# Patient Record
Sex: FEMALE | Race: ASIAN | NOT HISPANIC OR LATINO
[De-identification: names, ages, dates, MRNs, and addresses within clinical notes are randomized per-mention and may not be internally consistent; named-entity substitution may affect disease eponyms.]

---

## 2022-01-14 PROBLEM — Z00.00 ENCOUNTER FOR PREVENTIVE HEALTH EXAMINATION: Status: ACTIVE | Noted: 2022-01-14

## 2022-01-18 ENCOUNTER — APPOINTMENT (OUTPATIENT)
Dept: THORACIC SURGERY | Facility: CLINIC | Age: 77
End: 2022-01-18
Payer: MEDICARE

## 2022-01-21 ENCOUNTER — APPOINTMENT (OUTPATIENT)
Dept: THORACIC SURGERY | Facility: CLINIC | Age: 77
End: 2022-01-21
Payer: MEDICARE

## 2022-01-21 ENCOUNTER — OUTPATIENT (OUTPATIENT)
Dept: OUTPATIENT SERVICES | Facility: HOSPITAL | Age: 77
LOS: 1 days | End: 2022-01-21
Payer: MEDICARE

## 2022-01-21 VITALS
WEIGHT: 126 LBS | HEART RATE: 86 BPM | BODY MASS INDEX: 22.32 KG/M2 | HEIGHT: 63 IN | DIASTOLIC BLOOD PRESSURE: 72 MMHG | RESPIRATION RATE: 16 BRPM | OXYGEN SATURATION: 90 % | TEMPERATURE: 98.4 F | SYSTOLIC BLOOD PRESSURE: 152 MMHG

## 2022-01-21 DIAGNOSIS — R91.1 SOLITARY PULMONARY NODULE: ICD-10-CM

## 2022-01-21 DIAGNOSIS — Z86.59 PERSONAL HISTORY OF OTHER MENTAL AND BEHAVIORAL DISORDERS: ICD-10-CM

## 2022-01-21 DIAGNOSIS — Z01.818 ENCOUNTER FOR OTHER PREPROCEDURAL EXAMINATION: ICD-10-CM

## 2022-01-21 DIAGNOSIS — Z86.79 PERSONAL HISTORY OF OTHER DISEASES OF THE CIRCULATORY SYSTEM: ICD-10-CM

## 2022-01-21 DIAGNOSIS — Z87.39 PERSONAL HISTORY OF OTHER DISEASES OF THE MUSCULOSKELETAL SYSTEM AND CONNECTIVE TISSUE: ICD-10-CM

## 2022-01-21 DIAGNOSIS — Z86.39 PERSONAL HISTORY OF OTHER ENDOCRINE, NUTRITIONAL AND METABOLIC DISEASE: ICD-10-CM

## 2022-01-21 LAB
ALBUMIN SERPL ELPH-MCNC: 4.5 G/DL — SIGNIFICANT CHANGE UP (ref 3.3–5)
ALP SERPL-CCNC: 69 U/L — SIGNIFICANT CHANGE UP (ref 40–120)
ALT FLD-CCNC: 50 U/L — HIGH (ref 10–45)
ANION GAP SERPL CALC-SCNC: 16 MMOL/L — SIGNIFICANT CHANGE UP (ref 5–17)
APPEARANCE UR: CLEAR — SIGNIFICANT CHANGE UP
APTT BLD: 31.4 SEC — SIGNIFICANT CHANGE UP (ref 27.5–35.5)
AST SERPL-CCNC: 41 U/L — HIGH (ref 10–40)
BASOPHILS # BLD AUTO: 0.02 K/UL — SIGNIFICANT CHANGE UP (ref 0–0.2)
BASOPHILS NFR BLD AUTO: 0.4 % — SIGNIFICANT CHANGE UP (ref 0–2)
BILIRUB SERPL-MCNC: 0.3 MG/DL — SIGNIFICANT CHANGE UP (ref 0.2–1.2)
BILIRUB UR-MCNC: NEGATIVE — SIGNIFICANT CHANGE UP
BUN SERPL-MCNC: 18 MG/DL — SIGNIFICANT CHANGE UP (ref 7–23)
CALCIUM SERPL-MCNC: 9.3 MG/DL — SIGNIFICANT CHANGE UP (ref 8.4–10.5)
CHLORIDE SERPL-SCNC: 101 MMOL/L — SIGNIFICANT CHANGE UP (ref 96–108)
CO2 SERPL-SCNC: 28 MMOL/L — SIGNIFICANT CHANGE UP (ref 22–31)
COLOR SPEC: YELLOW — SIGNIFICANT CHANGE UP
CREAT SERPL-MCNC: 0.88 MG/DL — SIGNIFICANT CHANGE UP (ref 0.5–1.3)
DIFF PNL FLD: NEGATIVE — SIGNIFICANT CHANGE UP
EOSINOPHIL # BLD AUTO: 0.23 K/UL — SIGNIFICANT CHANGE UP (ref 0–0.5)
EOSINOPHIL NFR BLD AUTO: 4 % — SIGNIFICANT CHANGE UP (ref 0–6)
GLUCOSE SERPL-MCNC: 111 MG/DL — HIGH (ref 70–99)
GLUCOSE UR QL: >=1000
HCT VFR BLD CALC: 47.4 % — HIGH (ref 34.5–45)
HGB BLD-MCNC: 15.1 G/DL — SIGNIFICANT CHANGE UP (ref 11.5–15.5)
IMM GRANULOCYTES NFR BLD AUTO: 0.4 % — SIGNIFICANT CHANGE UP (ref 0–1.5)
INR BLD: 0.95 — SIGNIFICANT CHANGE UP (ref 0.88–1.16)
KETONES UR-MCNC: NEGATIVE — SIGNIFICANT CHANGE UP
LEUKOCYTE ESTERASE UR-ACNC: NEGATIVE — SIGNIFICANT CHANGE UP
LYMPHOCYTES # BLD AUTO: 1.83 K/UL — SIGNIFICANT CHANGE UP (ref 1–3.3)
LYMPHOCYTES # BLD AUTO: 32 % — SIGNIFICANT CHANGE UP (ref 13–44)
MCHC RBC-ENTMCNC: 27.6 PG — SIGNIFICANT CHANGE UP (ref 27–34)
MCHC RBC-ENTMCNC: 31.9 GM/DL — LOW (ref 32–36)
MCV RBC AUTO: 86.7 FL — SIGNIFICANT CHANGE UP (ref 80–100)
MONOCYTES # BLD AUTO: 0.58 K/UL — SIGNIFICANT CHANGE UP (ref 0–0.9)
MONOCYTES NFR BLD AUTO: 10.2 % — SIGNIFICANT CHANGE UP (ref 2–14)
NEUTROPHILS # BLD AUTO: 3.03 K/UL — SIGNIFICANT CHANGE UP (ref 1.8–7.4)
NEUTROPHILS NFR BLD AUTO: 53 % — SIGNIFICANT CHANGE UP (ref 43–77)
NITRITE UR-MCNC: NEGATIVE — SIGNIFICANT CHANGE UP
NRBC # BLD: 0 /100 WBCS — SIGNIFICANT CHANGE UP (ref 0–0)
PH UR: 5.5 — SIGNIFICANT CHANGE UP (ref 5–8)
PLATELET # BLD AUTO: 202 K/UL — SIGNIFICANT CHANGE UP (ref 150–400)
POTASSIUM SERPL-MCNC: 3.5 MMOL/L — SIGNIFICANT CHANGE UP (ref 3.5–5.3)
POTASSIUM SERPL-SCNC: 3.5 MMOL/L — SIGNIFICANT CHANGE UP (ref 3.5–5.3)
PROT SERPL-MCNC: 7.5 G/DL — SIGNIFICANT CHANGE UP (ref 6–8.3)
PROT UR-MCNC: NEGATIVE MG/DL — SIGNIFICANT CHANGE UP
PROTHROM AB SERPL-ACNC: 11.4 SEC — SIGNIFICANT CHANGE UP (ref 10.6–13.6)
RBC # BLD: 5.47 M/UL — HIGH (ref 3.8–5.2)
RBC # FLD: 13.2 % — SIGNIFICANT CHANGE UP (ref 10.3–14.5)
SODIUM SERPL-SCNC: 145 MMOL/L — SIGNIFICANT CHANGE UP (ref 135–145)
SP GR SPEC: 1.01 — SIGNIFICANT CHANGE UP (ref 1–1.03)
UROBILINOGEN FLD QL: 0.2 E.U./DL — SIGNIFICANT CHANGE UP
WBC # BLD: 5.71 K/UL — SIGNIFICANT CHANGE UP (ref 3.8–10.5)
WBC # FLD AUTO: 5.71 K/UL — SIGNIFICANT CHANGE UP (ref 3.8–10.5)

## 2022-01-21 PROCEDURE — 99205 OFFICE O/P NEW HI 60 MIN: CPT

## 2022-01-21 PROCEDURE — 85610 PROTHROMBIN TIME: CPT

## 2022-01-21 PROCEDURE — 85730 THROMBOPLASTIN TIME PARTIAL: CPT

## 2022-01-21 PROCEDURE — 36415 COLL VENOUS BLD VENIPUNCTURE: CPT

## 2022-01-21 PROCEDURE — 85025 COMPLETE CBC W/AUTO DIFF WBC: CPT

## 2022-01-21 PROCEDURE — 81003 URINALYSIS AUTO W/O SCOPE: CPT

## 2022-01-21 PROCEDURE — 80053 COMPREHEN METABOLIC PANEL: CPT

## 2022-01-21 RX ORDER — RALOXIFENE HYDROCHLORIDE 60 MG/1
60 TABLET ORAL
Refills: 0 | Status: ACTIVE | COMMUNITY

## 2022-01-21 RX ORDER — EMPAGLIFLOZIN 10 MG/1
10 TABLET, FILM COATED ORAL
Refills: 0 | Status: ACTIVE | COMMUNITY

## 2022-01-21 RX ORDER — ATORVASTATIN CALCIUM 10 MG/1
10 TABLET, FILM COATED ORAL
Refills: 0 | Status: ACTIVE | COMMUNITY

## 2022-01-21 RX ORDER — DONEPEZIL HYDROCHLORIDE 5 MG/1
5 TABLET ORAL
Refills: 0 | Status: ACTIVE | COMMUNITY

## 2022-01-21 RX ORDER — LOSARTAN POTASSIUM 50 MG/1
50 TABLET, FILM COATED ORAL
Refills: 0 | Status: ACTIVE | COMMUNITY

## 2022-01-21 RX ORDER — LEVETIRACETAM 500 MG/1
500 TABLET, FILM COATED ORAL
Refills: 0 | Status: ACTIVE | COMMUNITY

## 2022-01-21 RX ORDER — OLOPATADINE HYDROCHLORIDE 7 MG/ML
SOLUTION OPHTHALMIC
Refills: 0 | Status: ACTIVE | COMMUNITY

## 2022-01-21 NOTE — REVIEW OF SYSTEMS
soft/nondistended/nontender [Cough] : cough [SOB on Exertion] : shortness of breath during exertion [Negative] : Cardiovascular

## 2022-01-26 NOTE — CONSULT LETTER
[Dear  ___] : Dear  [unfilled], [Consult Letter:] : I had the pleasure of evaluating your patient, [unfilled]. [Please see my note below.] : Please see my note below. [Consult Closing:] : Thank you very much for allowing me to participate in the care of this patient.  If you have any questions, please do not hesitate to contact me. [Sincerely,] : Sincerely, [FreeTextEntry3] : Dr. Kashif Bermudez

## 2022-01-26 NOTE — END OF VISIT
[FreeTextEntry3] : I, SEGUN MONTES , am scribing for and in the presence of BRITTNEY MICHAEL the following sections: history of present illness, past medical/family/surgical/family/social history, review of systems, vital signs, physical exam, and disposition.\par \par

## 2022-01-26 NOTE — HISTORY OF PRESENT ILLNESS
[FreeTextEntry1] : 75 y/o female, never smoker, with a PMH of DM, HTN, HLD, osteoporosis, dementia,  schizophrenia, with recent CT chest done on 1/9/22 revealing RLL spiculated mass.  CT chest completed upon pre-op workup for cataract surgery. She is referred by DR. BENTON DELUNA for an initial evaluation. \par \par CT chest completed on 01/09/22:\par -within the posterior right lower lobe, lobulated mass with spiculated borders measuring 2.4 x 2.3 x 2.5cm which corresponds to the findings on x-ray. This is highly suspicious for malignancy.\par -superior and medial to the above-mentioned mass, there is a 1.7 x 2.4 x 2.6cm subsolid lesion.\par -within the right upper lobe, there is a new ground-glass measuring 2 x 1.5 x 1.4cm. \par -there is a questionable 0.3cm solid component\par -new ground-glass nodule which measures 0.8 x 0.7x 0.8cm \par -ground-glass nodule with spiculated borders in the superior segment of the left lower lobe measures 0.5cm\par -bilateral peribronchial wall thickening consistent with bronchiolitis. There are scattered areas of mucous plugging. \par \par

## 2022-01-26 NOTE — ASSESSMENT
[FreeTextEntry1] : 77 y/o female, never smoker, with a PMH of DM, HTN, HLD, osteoporosis, dementia with recent CT chest done on 1/9/22 revealing RLL spiculated mass. CXR and subsequent CT chest completed upon pre-op workup for cataract surgery. She is referred by DR. BENTON DELUNA for an initial evaluation. \par \par CT chest completed on 01/09/22: was reviewed which revealed: \par -within the posterior right lower lobe, lobulated mass with spiculated borders measuring 2.4 x 2.3 x 2.5cm which corresponds to the findings on x-ray. This is highly suspicious for malignancy.\par -superior and medial to the above-mentioned mass, there is a 1.7 x 2.4 x 2.6cm subsolid lesion.\par -within the right upper lobe, there is a new ground-glass measuring 2 x 1.5 x 1.4cm. \par -there is a questionable 0.3cm solid component\par -new ground-glass nodule which measures 0.8 x 0.7x 0.8cm \par -ground-glass nodule with spiculated borders in the superior segment of the left lower lobe measures 0.5cm\par -bilateral peribronchial wall thickening consistent with bronchiolitis. There are scattered areas of mucous plugging. \par \par Today the patient reports feeling generally well. She denies pain, fever, chills, unintentional weight loss and hemoptysis. She admits to SOB on exertion and a cough. Discussed that the above scan is concerning for a malignancy. Will order a PET scan to evaluate for any extrathoracic disease and PFTs for further evaluation. Will schedule patient for navigational bronchoscopy, EBUS with biopsy with Dr. Colvin. Labs drawn today. She should RTC after above.\par \par PLAN:\par 1. PET scan\par 2. PFTs\par 3. Navigational bronchoscopy and EBUS with biopsy\par 4. RTC

## 2022-01-28 ENCOUNTER — APPOINTMENT (OUTPATIENT)
Dept: CT IMAGING | Facility: HOSPITAL | Age: 77
End: 2022-01-28

## 2022-01-31 ENCOUNTER — NON-APPOINTMENT (OUTPATIENT)
Age: 77
End: 2022-01-31

## 2022-02-04 ENCOUNTER — FORM ENCOUNTER (OUTPATIENT)
Age: 77
End: 2022-02-04

## 2022-02-05 ENCOUNTER — FORM ENCOUNTER (OUTPATIENT)
Age: 77
End: 2022-02-05

## 2022-02-07 ENCOUNTER — APPOINTMENT (OUTPATIENT)
Dept: PULMONOLOGY | Facility: CLINIC | Age: 77
End: 2022-02-07
Payer: MEDICARE

## 2022-02-07 ENCOUNTER — NON-APPOINTMENT (OUTPATIENT)
Age: 77
End: 2022-02-07

## 2022-02-07 PROCEDURE — 99211 OFF/OP EST MAY X REQ PHY/QHP: CPT

## 2022-02-08 ENCOUNTER — APPOINTMENT (OUTPATIENT)
Dept: PULMONOLOGY | Facility: HOSPITAL | Age: 77
End: 2022-02-08

## 2022-02-08 ENCOUNTER — NON-APPOINTMENT (OUTPATIENT)
Age: 77
End: 2022-02-08

## 2022-02-08 ENCOUNTER — RESULT REVIEW (OUTPATIENT)
Age: 77
End: 2022-02-08

## 2022-02-08 ENCOUNTER — OUTPATIENT (OUTPATIENT)
Dept: OUTPATIENT SERVICES | Facility: HOSPITAL | Age: 77
LOS: 1 days | Discharge: HOME | End: 2022-02-08
Payer: MEDICARE

## 2022-02-08 LAB
GLUCOSE BLDC GLUCOMTR-MCNC: 99 MG/DL — SIGNIFICANT CHANGE UP (ref 70–99)
GRAM STN FLD: SIGNIFICANT CHANGE UP
SARS-COV-2 N GENE NPH QL NAA+PROBE: NOT DETECTED
SPECIMEN SOURCE: SIGNIFICANT CHANGE UP

## 2022-02-08 PROCEDURE — 71045 X-RAY EXAM CHEST 1 VIEW: CPT | Mod: 26

## 2022-02-08 PROCEDURE — 88305 TISSUE EXAM BY PATHOLOGIST: CPT

## 2022-02-08 PROCEDURE — 87015 SPECIMEN INFECT AGNT CONCNTJ: CPT

## 2022-02-08 PROCEDURE — 88333 PATH CONSLTJ SURG CYTO XM 1: CPT

## 2022-02-08 PROCEDURE — 31629 BRONCHOSCOPY/NEEDLE BX EACH: CPT | Mod: GC

## 2022-02-08 PROCEDURE — 87116 MYCOBACTERIA CULTURE: CPT

## 2022-02-08 PROCEDURE — 88173 CYTOPATH EVAL FNA REPORT: CPT

## 2022-02-08 PROCEDURE — 31632 BRONCHOSCOPY/LUNG BX ADDL: CPT | Mod: GC

## 2022-02-08 PROCEDURE — 31652 BRONCH EBUS SAMPLNG 1/2 NODE: CPT | Mod: GC

## 2022-02-08 PROCEDURE — 31652 BRONCH EBUS SAMPLNG 1/2 NODE: CPT

## 2022-02-08 PROCEDURE — 88344 IMHCHEM/IMCYTCHM EA MLT ANTB: CPT | Mod: 26

## 2022-02-08 PROCEDURE — 31654 BRONCH EBUS IVNTJ PERPH LES: CPT | Mod: GC

## 2022-02-08 PROCEDURE — 31629 BRONCHOSCOPY/NEEDLE BX EACH: CPT

## 2022-02-08 PROCEDURE — 88112 CYTOPATH CELL ENHANCE TECH: CPT

## 2022-02-08 PROCEDURE — 88173 CYTOPATH EVAL FNA REPORT: CPT | Mod: 26

## 2022-02-08 PROCEDURE — 31627 NAVIGATIONAL BRONCHOSCOPY: CPT | Mod: GC

## 2022-02-08 PROCEDURE — 88112 CYTOPATH CELL ENHANCE TECH: CPT | Mod: 26,59

## 2022-02-08 PROCEDURE — 31624 DX BRONCHOSCOPE/LAVAGE: CPT

## 2022-02-08 PROCEDURE — 87206 SMEAR FLUORESCENT/ACID STAI: CPT

## 2022-02-08 PROCEDURE — 88305 TISSUE EXAM BY PATHOLOGIST: CPT | Mod: 26,59

## 2022-02-08 PROCEDURE — 88341 IMHCHEM/IMCYTCHM EA ADD ANTB: CPT

## 2022-02-08 PROCEDURE — 31624 DX BRONCHOSCOPE/LAVAGE: CPT | Mod: GC

## 2022-02-08 PROCEDURE — 82962 GLUCOSE BLOOD TEST: CPT

## 2022-02-08 PROCEDURE — 88342 IMHCHEM/IMCYTCHM 1ST ANTB: CPT | Mod: 26,59

## 2022-02-08 PROCEDURE — 87070 CULTURE OTHR SPECIMN AEROBIC: CPT

## 2022-02-08 PROCEDURE — 88333 PATH CONSLTJ SURG CYTO XM 1: CPT | Mod: 26,59

## 2022-02-08 PROCEDURE — 71045 X-RAY EXAM CHEST 1 VIEW: CPT

## 2022-02-08 PROCEDURE — 88344 IMHCHEM/IMCYTCHM EA MLT ANTB: CPT

## 2022-02-08 PROCEDURE — 87102 FUNGUS ISOLATION CULTURE: CPT

## 2022-02-08 PROCEDURE — 31628 BRONCHOSCOPY/LUNG BX EACH: CPT | Mod: GC

## 2022-02-08 PROCEDURE — 88342 IMHCHEM/IMCYTCHM 1ST ANTB: CPT | Mod: XU

## 2022-02-09 LAB
NIGHT BLUE STAIN TISS: SIGNIFICANT CHANGE UP
SPECIMEN SOURCE: SIGNIFICANT CHANGE UP

## 2022-02-10 ENCOUNTER — NON-APPOINTMENT (OUTPATIENT)
Age: 77
End: 2022-02-10

## 2022-02-10 LAB
CULTURE RESULTS: NO GROWTH — SIGNIFICANT CHANGE UP
NON-GYNECOLOGICAL CYTOLOGY STUDY: SIGNIFICANT CHANGE UP
SPECIMEN SOURCE: SIGNIFICANT CHANGE UP

## 2022-02-11 ENCOUNTER — NON-APPOINTMENT (OUTPATIENT)
Age: 77
End: 2022-02-11

## 2022-02-11 LAB
NON-GYNECOLOGICAL CYTOLOGY STUDY: SIGNIFICANT CHANGE UP
SURGICAL PATHOLOGY STUDY: SIGNIFICANT CHANGE UP

## 2022-02-18 ENCOUNTER — APPOINTMENT (OUTPATIENT)
Dept: THORACIC SURGERY | Facility: CLINIC | Age: 77
End: 2022-02-18
Payer: MEDICARE

## 2022-02-18 ENCOUNTER — OUTPATIENT (OUTPATIENT)
Dept: OUTPATIENT SERVICES | Facility: HOSPITAL | Age: 77
LOS: 1 days | End: 2022-02-18
Payer: MEDICARE

## 2022-02-18 VITALS
SYSTOLIC BLOOD PRESSURE: 188 MMHG | BODY MASS INDEX: 22.32 KG/M2 | DIASTOLIC BLOOD PRESSURE: 79 MMHG | HEART RATE: 90 BPM | WEIGHT: 126 LBS | TEMPERATURE: 98.5 F | OXYGEN SATURATION: 91 % | RESPIRATION RATE: 17 BRPM | HEIGHT: 63 IN

## 2022-02-18 DIAGNOSIS — Z01.818 ENCOUNTER FOR OTHER PREPROCEDURAL EXAMINATION: ICD-10-CM

## 2022-02-18 DIAGNOSIS — R91.1 SOLITARY PULMONARY NODULE: ICD-10-CM

## 2022-02-18 LAB
ALBUMIN SERPL ELPH-MCNC: 4.4 G/DL — SIGNIFICANT CHANGE UP (ref 3.3–5)
ALP SERPL-CCNC: 67 U/L — SIGNIFICANT CHANGE UP (ref 40–120)
ALT FLD-CCNC: 39 U/L — SIGNIFICANT CHANGE UP (ref 10–45)
ANION GAP SERPL CALC-SCNC: 13 MMOL/L — SIGNIFICANT CHANGE UP (ref 5–17)
APPEARANCE UR: CLEAR — SIGNIFICANT CHANGE UP
APTT BLD: 31.5 SEC — SIGNIFICANT CHANGE UP (ref 27.5–35.5)
AST SERPL-CCNC: 31 U/L — SIGNIFICANT CHANGE UP (ref 10–40)
BASOPHILS # BLD AUTO: 0.02 K/UL — SIGNIFICANT CHANGE UP (ref 0–0.2)
BASOPHILS NFR BLD AUTO: 0.4 % — SIGNIFICANT CHANGE UP (ref 0–2)
BILIRUB SERPL-MCNC: 0.2 MG/DL — SIGNIFICANT CHANGE UP (ref 0.2–1.2)
BILIRUB UR-MCNC: NEGATIVE — SIGNIFICANT CHANGE UP
BLD GP AB SCN SERPL QL: NEGATIVE — SIGNIFICANT CHANGE UP
BUN SERPL-MCNC: 23 MG/DL — SIGNIFICANT CHANGE UP (ref 7–23)
CALCIUM SERPL-MCNC: 10 MG/DL — SIGNIFICANT CHANGE UP (ref 8.4–10.5)
CHLORIDE SERPL-SCNC: 101 MMOL/L — SIGNIFICANT CHANGE UP (ref 96–108)
CHOLEST SERPL-MCNC: 166 MG/DL — SIGNIFICANT CHANGE UP
CO2 SERPL-SCNC: 29 MMOL/L — SIGNIFICANT CHANGE UP (ref 22–31)
COLOR SPEC: YELLOW — SIGNIFICANT CHANGE UP
CREAT SERPL-MCNC: 0.93 MG/DL — SIGNIFICANT CHANGE UP (ref 0.5–1.3)
DIFF PNL FLD: NEGATIVE — SIGNIFICANT CHANGE UP
EOSINOPHIL # BLD AUTO: 0.19 K/UL — SIGNIFICANT CHANGE UP (ref 0–0.5)
EOSINOPHIL NFR BLD AUTO: 3.4 % — SIGNIFICANT CHANGE UP (ref 0–6)
GLUCOSE SERPL-MCNC: 115 MG/DL — HIGH (ref 70–99)
GLUCOSE UR QL: >=1000
HCT VFR BLD CALC: 46.1 % — HIGH (ref 34.5–45)
HDLC SERPL-MCNC: 51 MG/DL — SIGNIFICANT CHANGE UP
HGB BLD-MCNC: 14.3 G/DL — SIGNIFICANT CHANGE UP (ref 11.5–15.5)
IMM GRANULOCYTES NFR BLD AUTO: 0.5 % — SIGNIFICANT CHANGE UP (ref 0–1.5)
INR BLD: 0.93 — SIGNIFICANT CHANGE UP (ref 0.88–1.16)
KETONES UR-MCNC: NEGATIVE — SIGNIFICANT CHANGE UP
LEUKOCYTE ESTERASE UR-ACNC: NEGATIVE — SIGNIFICANT CHANGE UP
LIPID PNL WITH DIRECT LDL SERPL: 71 MG/DL — SIGNIFICANT CHANGE UP
LYMPHOCYTES # BLD AUTO: 2.02 K/UL — SIGNIFICANT CHANGE UP (ref 1–3.3)
LYMPHOCYTES # BLD AUTO: 36 % — SIGNIFICANT CHANGE UP (ref 13–44)
MCHC RBC-ENTMCNC: 27 PG — SIGNIFICANT CHANGE UP (ref 27–34)
MCHC RBC-ENTMCNC: 31 GM/DL — LOW (ref 32–36)
MCV RBC AUTO: 87 FL — SIGNIFICANT CHANGE UP (ref 80–100)
MONOCYTES # BLD AUTO: 0.52 K/UL — SIGNIFICANT CHANGE UP (ref 0–0.9)
MONOCYTES NFR BLD AUTO: 9.3 % — SIGNIFICANT CHANGE UP (ref 2–14)
NEUTROPHILS # BLD AUTO: 2.83 K/UL — SIGNIFICANT CHANGE UP (ref 1.8–7.4)
NEUTROPHILS NFR BLD AUTO: 50.4 % — SIGNIFICANT CHANGE UP (ref 43–77)
NITRITE UR-MCNC: NEGATIVE — SIGNIFICANT CHANGE UP
NON HDL CHOLESTEROL: 115 MG/DL — SIGNIFICANT CHANGE UP
NRBC # BLD: 0 /100 WBCS — SIGNIFICANT CHANGE UP (ref 0–0)
PH UR: 7 — SIGNIFICANT CHANGE UP (ref 5–8)
PLATELET # BLD AUTO: 219 K/UL — SIGNIFICANT CHANGE UP (ref 150–400)
POTASSIUM SERPL-MCNC: 3.5 MMOL/L — SIGNIFICANT CHANGE UP (ref 3.5–5.3)
POTASSIUM SERPL-SCNC: 3.5 MMOL/L — SIGNIFICANT CHANGE UP (ref 3.5–5.3)
PROT SERPL-MCNC: 7.6 G/DL — SIGNIFICANT CHANGE UP (ref 6–8.3)
PROT UR-MCNC: NEGATIVE MG/DL — SIGNIFICANT CHANGE UP
PROTHROM AB SERPL-ACNC: 11.2 SEC — SIGNIFICANT CHANGE UP (ref 10.6–13.6)
RBC # BLD: 5.3 M/UL — HIGH (ref 3.8–5.2)
RBC # FLD: 13.4 % — SIGNIFICANT CHANGE UP (ref 10.3–14.5)
RH IG SCN BLD-IMP: POSITIVE — SIGNIFICANT CHANGE UP
SODIUM SERPL-SCNC: 143 MMOL/L — SIGNIFICANT CHANGE UP (ref 135–145)
SP GR SPEC: 1.02 — SIGNIFICANT CHANGE UP (ref 1–1.03)
TRIGL SERPL-MCNC: 220 MG/DL — HIGH
UROBILINOGEN FLD QL: 0.2 E.U./DL — SIGNIFICANT CHANGE UP
WBC # BLD: 5.61 K/UL — SIGNIFICANT CHANGE UP (ref 3.8–10.5)
WBC # FLD AUTO: 5.61 K/UL — SIGNIFICANT CHANGE UP (ref 3.8–10.5)

## 2022-02-18 PROCEDURE — 36415 COLL VENOUS BLD VENIPUNCTURE: CPT

## 2022-02-18 PROCEDURE — 99215 OFFICE O/P EST HI 40 MIN: CPT

## 2022-02-18 PROCEDURE — 80061 LIPID PANEL: CPT

## 2022-02-18 PROCEDURE — 86901 BLOOD TYPING SEROLOGIC RH(D): CPT

## 2022-02-18 PROCEDURE — 86900 BLOOD TYPING SEROLOGIC ABO: CPT

## 2022-02-18 PROCEDURE — 85730 THROMBOPLASTIN TIME PARTIAL: CPT

## 2022-02-18 PROCEDURE — 80053 COMPREHEN METABOLIC PANEL: CPT

## 2022-02-18 PROCEDURE — 86850 RBC ANTIBODY SCREEN: CPT

## 2022-02-18 PROCEDURE — 85025 COMPLETE CBC W/AUTO DIFF WBC: CPT

## 2022-02-18 PROCEDURE — 85610 PROTHROMBIN TIME: CPT

## 2022-02-18 PROCEDURE — 81003 URINALYSIS AUTO W/O SCOPE: CPT

## 2022-02-24 ENCOUNTER — NON-APPOINTMENT (OUTPATIENT)
Age: 77
End: 2022-02-24

## 2022-02-24 RX ORDER — QUETIAPINE FUMARATE 50 MG/1
50 TABLET ORAL
Refills: 0 | Status: ACTIVE | COMMUNITY

## 2022-02-24 RX ORDER — QUETIAPINE FUMARATE 100 MG/1
100 TABLET ORAL
Refills: 0 | Status: DISCONTINUED | COMMUNITY
End: 2022-02-24

## 2022-02-24 NOTE — H&P ADULT - ASSESSMENT
77 y/o female, never smoker, Magnetic speaker, with a PMH of DM, HTN, HLD, osteoporosis, dementia, schizophrenia, with recent CT chest done on 1/9/22 revealing RLL spiculated mass. CT chest completed upon pre-op workup for cataract surgery. She presents today to review PET scan and EBUS pathology done on 2/8/22. She is referred by DR. BENTON DELUNA.    CT chest completed on 01/09/22:  -within the posterior right lower lobe, lobulated mass with spiculated borders measuring 2.4 x 2.3 x 2.5cm which corresponds to the findings on x-ray. This is highly suspicious for malignancy.  -superior and medial to the above-mentioned mass, there is a 1.7 x 2.4 x 2.6cm subsolid lesion.  -within the right upper lobe, there is a new ground-glass measuring 2 x 1.5 x 1.4cm.   -there is a questionable 0.3cm solid component  -new ground-glass nodule which measures 0.8 x 0.7x 0.8cm   -ground-glass nodule with spiculated borders in the superior segment of the left lower lobe measures 0.5cm  -bilateral peribronchial wall thickening consistent with bronchiolitis. There are scattered areas of mucous plugging.    PET scan done on 1/22/22:  - right lower lobe mass demonstrates increased metabolic activity and is highly suspicious for malignancy (SUV 4.3) (lobulated, pleural based mass with spiculated borders measures 2.7 x 1.9)  - subsolid mass in the right lower lobe does not demonstrate increased metabolic activity however cannot exclude malignancy ( spiculated subsolid lesions measuring up to 2.6 cm)  - right upper lobe groundglass opacity and left lung nodules. Cannot completely exclude metastatic disease if the patient is proven to have malignancy  - diffuse hepatic steatosis     LUNG, RIGHT LOWER LOBE, EBUS-GUIDED FNA on 2/8/22: POSITIVE FOR MALIGNANT CELLS. Adenocarcinoma. Cell block shows similar findings.    PFT unable to perform b/c poor inhalation technique. Six minute walk reduced with mild desaturation.     MRI Brain on 02/20/22: no evidence of intracranial metastatic disease.     Patient is alert, oriented to her name, people, and place. She can follows simple commands, but doesn't answer question. Per her son, patient has no change of mental status, agitation, weight loss, or cough.    Based on review of imaging this appears to be a stage one lung cancer and according to NCCN guidelines surgical resection is advised. Will plan for a Right VATs, RA, Right lower lobe lobectomy with MLND. Nature of the surgery, risks, benefits, alternatives, expected postoperative course, recovery and long term results were reviewed. All questions answered. Patient motivated to proceed with surgery.

## 2022-02-24 NOTE — HISTORY OF PRESENT ILLNESS
[FreeTextEntry1] : 77 y/o female, never smoker, with a PMH of DM, HTN, HLD, osteoporosis, dementia, schizophrenia, with recent CT chest done on 1/9/22 revealing RLL spiculated mass. CT chest completed upon pre-op workup for cataract surgery. She presents today to review PET scan and EBUS pathology done on 2/8/22. She is referred by DR. BENTON DELUNA.\par \par CT chest completed on 01/09/22:\par -within the posterior right lower lobe, lobulated mass with spiculated borders measuring 2.4 x 2.3 x 2.5cm which corresponds to the findings on x-ray. This is highly suspicious for malignancy.\par -superior and medial to the above-mentioned mass, there is a 1.7 x 2.4 x 2.6cm subsolid lesion.\par -within the right upper lobe, there is a new ground-glass measuring 2 x 1.5 x 1.4cm. \par -there is a questionable 0.3cm solid component\par -new ground-glass nodule which measures 0.8 x 0.7x 0.8cm \par -ground-glass nodule with spiculated borders in the superior segment of the left lower lobe measures 0.5cm\par -bilateral peribronchial wall thickening consistent with bronchiolitis. There are scattered areas of mucous plugging.\par \par PET scan done on 1/22/22:\par -  right lower lobe mass demonstrates increased metabolic activity and is highly suspicious for malignancy (SUV 4.3) (lobulated, pleural based mass with spiculated borders measures 2.7 x 1.9)\par - subsolid mass in the right lower lobe does not demonstrate increased metabolic activity however cannot exclude malignancy ( spiculated subsolid lesions measuring up to 2.6 cm)\par - right upper lobe groundglass opacity and left lung nodules. Cannot completely exclude metastatic disease if the patient is proven to have malignancy\par - diffuse hepatic steatosis \par \par LUNG, RIGHT LOWER LOBE,  EBUS-GUIDED FNA on 2/8/22:\par POSITIVE FOR MALIGNANT CELLS.\par Adenocarcinoma.\par Cell block shows similar findings.\par BRONCHOALVEOLAR LAVAGE, RIGHT UPPER LOBE \par NEGATIVE FOR MALIGNANT CELLS.\par Ciliated bronchial cells, histiocytes, and inflammatory cells.\par Cell block is noncontributory.\par LYMPH NODE, 11 RS, EBUS-GUIDED FNA\par NEGATIVE FOR MALIGNANT CELLS.\par Ciliated bronchial cells and a polymorphous population of lymphoid cells,\par consistent with a reactive lymph node.\par LYMPH NODE, 7, EBUS-GUIDED FNA\par NEGATIVE FOR MALIGNANT CELLS.\par Ciliated bronchial cells and polymorphous population of lymphoid cells,\par consistent with a reactive lymph node.\par 1. Lung, right lower lobe; biopsy:\par - Adenocarcinoma - See Note.\par Note: Immunohistochemical staining on block 1A shows the neoplastic cells\par to be positive for TTF1-Napsin, while negative for p40. These findings\par support the above diagnosis.\par 2. Lung, right upper lobe; biopsy:\par - Minute fragments of poorly preserved tissue; no definitive\par diagnosis can be rendered on this material - See Note.\par \par \par \par \par  5

## 2022-02-24 NOTE — ASSESSMENT
[FreeTextEntry1] : 77 y/o female, never smoker, Taishanese speaker, with a PMH of DM, HTN, HLD, osteoporosis, dementia, schizophrenia, with recent CT chest done on 1/9/22 revealing RLL spiculated mass. CT chest completed upon pre-op workup for cataract surgery. She presents today to review PET scan and EBUS pathology done on 2/8/22. She is referred by DR. BENTON DELUNA.\par \par CT chest completed on 01/09/22:\par -within the posterior right lower lobe, lobulated mass with spiculated borders measuring 2.4 x 2.3 x 2.5cm which corresponds to the findings on x-ray. This is highly suspicious for malignancy.\par -superior and medial to the above-mentioned mass, there is a 1.7 x 2.4 x 2.6cm subsolid lesion.\par -within the right upper lobe, there is a new ground-glass measuring 2 x 1.5 x 1.4cm. \par -there is a questionable 0.3cm solid component\par -new ground-glass nodule which measures 0.8 x 0.7x 0.8cm \par -ground-glass nodule with spiculated borders in the superior segment of the left lower lobe measures 0.5cm\par -bilateral peribronchial wall thickening consistent with bronchiolitis. There are scattered areas of mucous plugging.\par \par PET scan done on 1/22/22:\par -  right lower lobe mass demonstrates increased metabolic activity and is highly suspicious for malignancy (SUV 4.3) (lobulated, pleural based mass with spiculated borders measures 2.7 x 1.9)\par - subsolid mass in the right lower lobe does not demonstrate increased metabolic activity however cannot exclude malignancy ( spiculated subsolid lesions measuring up to 2.6 cm)\par - right upper lobe groundglass opacity and left lung nodules. Cannot completely exclude metastatic disease if the patient is proven to have malignancy\par - diffuse hepatic steatosis \par \par LUNG, RIGHT LOWER LOBE,  EBUS-GUIDED FNA on 2/8/22:\par POSITIVE FOR MALIGNANT CELLS.\par Adenocarcinoma.\par Cell block shows similar findings.\par BRONCHOALVEOLAR LAVAGE, RIGHT UPPER LOBE \par NEGATIVE FOR MALIGNANT CELLS.\par Ciliated bronchial cells, histiocytes, and inflammatory cells.\par Cell block is noncontributory.\par LYMPH NODE, 11 RS, EBUS-GUIDED FNA\par NEGATIVE FOR MALIGNANT CELLS.\par Ciliated bronchial cells and a polymorphous population of lymphoid cells,\par consistent with a reactive lymph node.\par LYMPH NODE, 7, EBUS-GUIDED FNA\par NEGATIVE FOR MALIGNANT CELLS.\par Ciliated bronchial cells and polymorphous population of lymphoid cells,\par consistent with a reactive lymph node.\par 1. Lung, right lower lobe; biopsy:\par - Adenocarcinoma - See Note.\par Note: Immunohistochemical staining on block 1A shows the neoplastic cells\par to be positive for TTF1-Napsin, while negative for p40. These findings\par support the above diagnosis.\par 2. Lung, right upper lobe; biopsy:\par - Minute fragments of poorly preserved tissue; no definitive\par diagnosis can be rendered on this material - See Note.\par \par PFT unable to perform b/c poor inhalation technique. Six minute walk reduced with mild desaturation.  \par \par Patient is alert, oriented to her name, people, and place.  She can follows simple commands, but doesn't answer question. Per her son, patient has no change of mental status, agitation, weight loss, or cough.\par \par Patient underwent an EBUS and pathology revealed Right lower lobe biopsy Adenocarcinoma with negative lymph node biopsies. Based on review of imaging this appears to be a stage one lung cancer and according to NCCN guidelines surgical resection is advised. Will plan for a Right VATs, RA, Right lower lobe lobectomy with MLND. Nature of the surgery, risks, benefits, alternatives, expected postoperative course, recovery and long term results were reviewed. All questions answered. Patient motivated to proceed with surgery. Will coordinate for near future. Will obtain a brain MRI to complete the staging workup. \par \par I have reviewed the patient's medical records and diagnostic images at time of this office consultation and have made the following recommendation:\par \par 1. Right VATs, RA, Right lower lobe lobectomy with MLND on 3/1/2022.  \par 2. Medical and Cardiac clearance\par 3. Brain MRI\par 4. Blood work done in the office today\par 5. Schedule COVID PCR testing 72hrs perior to the surgery. \par \par I, BARRIE Hernandez, am scribing for and the presence of BRITTNEY Morales, the following sections: history of present illness, past medical/family/surgical/family/social history, review of systems, vital signs, physical exam, and disposition.\par \par \par \par \par \par

## 2022-02-24 NOTE — H&P ADULT - NSICDXPASTMEDICALHX_GEN_ALL_CORE_FT
PAST MEDICAL HISTORY:  Hyperlipidemia     Hypertension     Osteoporosis     Schizophrenia     Type 2 diabetes mellitus

## 2022-02-24 NOTE — PHYSICAL EXAM
[Neck Appearance] : the appearance of the neck was normal [Neck Cervical Mass (___cm)] : no neck mass was observed [Jugular Venous Distention Increased] : there was no jugular-venous distention [Thyroid Diffuse Enlargement] : the thyroid was not enlarged [Thyroid Nodule] : there were no palpable thyroid nodules [Auscultation Breath Sounds / Voice Sounds] : lungs were clear to auscultation bilaterally [Heart Rate And Rhythm] : heart rate was normal and rhythm regular [Heart Sounds] : normal S1 and S2 [Heart Sounds Gallop] : no gallops [Murmurs] : no murmurs [Heart Sounds Pericardial Friction Rub] : no pericardial rub [Examination Of The Chest] : the chest was normal in appearance [Chest Visual Inspection Thoracic Asymmetry] : no chest asymmetry [Diminished Respiratory Excursion] : normal chest expansion [Bowel Sounds] : normal bowel sounds [Abdomen Soft] : soft [Abdomen Tenderness] : non-tender [] : no hepato-splenomegaly [Abdomen Mass (___ Cm)] : no abdominal mass palpated [FreeTextEntry1] : oriented to name and place, self-talking, cannot follow full commands

## 2022-02-24 NOTE — H&P ADULT - NSHPOUTPATIENTPROVIDERS_GEN_ALL_CORE
REF/PCP:    DR. BENTON DELUNA  [address] [place] [address]  P: [phone]  F: [phone]    PULM:         DR. LIZZY ALVAREZ   P: [phone]  F: [phone]    Card:           Ry Posadas MD   [address], [place] [address]  P: [phone]  F: [phone]    Onc:           DR. ELTA FAITH   [address] [address]  P: [phone]  F: [phone]

## 2022-02-24 NOTE — H&P ADULT - HISTORY OF PRESENT ILLNESS
75 y/o female, never smoker, with a PMH of DM, HTN, HLD, osteoporosis, dementia, schizophrenia, with recent CT chest done on 1/9/22 revealing RLL spiculated mass. CT chest completed upon pre-op workup for cataract surgery.     PET scan done on 1/22/22:  - right lower lobe mass demonstrates increased metabolic activity and is highly suspicious for malignancy (SUV 4.3) (lobulated, pleural based mass with spiculated borders measures 2.7 x 1.9)  - subsolid mass in the right lower lobe does not demonstrate increased metabolic activity however cannot exclude malignancy ( spiculated subsolid lesions measuring up to 2.6 cm)  - right upper lobe groundglass opacity and left lung nodules. Cannot completely exclude metastatic disease if the patient is proven to have malignancy  - diffuse hepatic steatosis     LUNG, RIGHT LOWER LOBE, EBUS-GUIDED FNA on 2/8/22: positive for malignant  cells. Adenocarcinoma. Cell block shows similar findings.    Pt has no complaints. There's no unintentional weight loss, fatigue, poor appetite, cough, hemoptysis, or SOB.      75 y/o female, never smoker, with a PMH of DM, HTN, HLD, osteoporosis, dementia, schizophrenia, with recent CT chest done on 1/9/22 revealing RLL spiculated mass. CT chest completed upon pre-op workup for cataract surgery.     PET scan done on 1/22/22:  - right lower lobe mass demonstrates increased metabolic activity and is highly suspicious for malignancy (SUV 4.3) (lobulated, pleural based mass with spiculated borders measures 2.7 x 1.9)  - subsolid mass in the right lower lobe does not demonstrate increased metabolic activity however cannot exclude malignancy ( spiculated subsolid lesions measuring up to 2.6 cm)  - right upper lobe groundglass opacity and left lung nodules. Cannot completely exclude metastatic disease if the patient is proven to have malignancy  - diffuse hepatic steatosis     LUNG, RIGHT LOWER LOBE, EBUS-GUIDED FNA on 2/8/22: positive for malignant  cells. Adenocarcinoma. Cell block shows similar findings.    Pt has no complaints. There's no unintentional weight loss, fatigue, poor appetite, cough, hemoptysis, or SOB.     Patient seen in same day holding area; Reports no changes to PMHx or medications since last seen by our team. Denies acute or current SOB, chest pain, palpitation, N/V/D, fever/chills, recent illness, or any other concerning symptoms. Pt reports nothing to eat or drink since last night.

## 2022-02-24 NOTE — H&P ADULT - NSHPLABSRESULTS_GEN_ALL_CORE
CT chest completed on 01/09/22:  -within the posterior right lower lobe, lobulated mass with spiculated borders measuring 2.4 x 2.3 x 2.5cm which corresponds to the findings on x-ray. This is highly suspicious for malignancy.  -superior and medial to the above-mentioned mass, there is a 1.7 x 2.4 x 2.6cm subsolid lesion.  -within the right upper lobe, there is a new ground-glass measuring 2 x 1.5 x 1.4cm.   -there is a questionable 0.3cm solid component  -new ground-glass nodule which measures 0.8 x 0.7x 0.8cm   -ground-glass nodule with spiculated borders in the superior segment of the left lower lobe measures 0.5cm  -bilateral peribronchial wall thickening consistent with bronchiolitis. There are scattered areas of mucous plugging.    PET scan done on 1/22/22:  - right lower lobe mass demonstrates increased metabolic activity and is highly suspicious for malignancy (SUV 4.3) (lobulated, pleural based mass with spiculated borders measures 2.7 x 1.9)  - subsolid mass in the right lower lobe does not demonstrate increased metabolic activity however cannot exclude malignancy ( spiculated subsolid lesions measuring up to 2.6 cm)  - right upper lobe groundglass opacity and left lung nodules. Cannot completely exclude metastatic disease if the patient is proven to have malignancy  - diffuse hepatic steatosis     LUNG, RIGHT LOWER LOBE, EBUS-GUIDED FNA on 2/8/22:  POSITIVE FOR MALIGNANT CELLS.  Adenocarcinoma.  Cell block shows similar findings.  BRONCHOALVEOLAR LAVAGE, RIGHT UPPER LOBE   NEGATIVE FOR MALIGNANT CELLS.  Ciliated bronchial cells, histiocytes, and inflammatory cells.  Cell block is noncontributory.  LYMPH NODE, 11 RS, EBUS-GUIDED FNA  NEGATIVE FOR MALIGNANT CELLS.  Ciliated bronchial cells and a polymorphous population of lymphoid cells,  consistent with a reactive lymph node.  LYMPH NODE, 7, EBUS-GUIDED FNA  NEGATIVE FOR MALIGNANT CELLS.  Ciliated bronchial cells and polymorphous population of lymphoid cells,  consistent with a reactive lymph node.  1. Lung, right lower lobe; biopsy:  - Adenocarcinoma - See Note.  Note: Immunohistochemical staining on block 1A shows the neoplastic cells  to be positive for TTF1-Napsin, while negative for p40. These findings  support the above diagnosis.  2. Lung, right upper lobe; biopsy:  - Minute fragments of poorly preserved tissue; no definitive  diagnosis can be rendered on this material - See Note.    PFT unable to perform b/c poor inhalation technique. Six minute walk reduced with mild desaturation.     MRI Brain on 2/20/22  1. No hemorrhage, mass, midline shift or acute infarction. No abnormal enhancement. No evidence of intracranial metastatic disease.   2. mildly enlarged ventricles, increased, which could suggest a component of communicating hydrocephalus. Clinical correlation is recommended.   3. interval development of remote lacunar infarcts in the right basal ganglia.   4. mild white matter disease likely mild chronic microvascular ischemic disease, progressed.

## 2022-02-26 ENCOUNTER — LABORATORY RESULT (OUTPATIENT)
Age: 77
End: 2022-02-26

## 2022-02-28 ENCOUNTER — NON-APPOINTMENT (OUTPATIENT)
Age: 77
End: 2022-02-28

## 2022-02-28 ENCOUNTER — TRANSCRIPTION ENCOUNTER (OUTPATIENT)
Age: 77
End: 2022-02-28

## 2022-02-28 VITALS
SYSTOLIC BLOOD PRESSURE: 154 MMHG | HEART RATE: 83 BPM | HEIGHT: 63 IN | RESPIRATION RATE: 16 BRPM | DIASTOLIC BLOOD PRESSURE: 77 MMHG | TEMPERATURE: 99 F | OXYGEN SATURATION: 95 % | WEIGHT: 126.1 LBS

## 2022-02-28 NOTE — PATIENT PROFILE ADULT - FALL HARM RISK - HARM RISK INTERVENTIONS

## 2022-03-01 ENCOUNTER — APPOINTMENT (OUTPATIENT)
Dept: THORACIC SURGERY | Facility: HOSPITAL | Age: 77
End: 2022-03-01

## 2022-03-01 ENCOUNTER — INPATIENT (INPATIENT)
Facility: HOSPITAL | Age: 77
LOS: 5 days | Discharge: HOME | DRG: 164 | End: 2022-03-07
Attending: THORACIC SURGERY (CARDIOTHORACIC VASCULAR SURGERY) | Admitting: THORACIC SURGERY (CARDIOTHORACIC VASCULAR SURGERY)
Payer: MEDICARE

## 2022-03-01 ENCOUNTER — RESULT REVIEW (OUTPATIENT)
Age: 77
End: 2022-03-01

## 2022-03-01 DIAGNOSIS — E11.9 TYPE 2 DIABETES MELLITUS WITHOUT COMPLICATIONS: ICD-10-CM

## 2022-03-01 DIAGNOSIS — M81.0 AGE-RELATED OSTEOPOROSIS WITHOUT CURRENT PATHOLOGICAL FRACTURE: ICD-10-CM

## 2022-03-01 DIAGNOSIS — I10 ESSENTIAL (PRIMARY) HYPERTENSION: ICD-10-CM

## 2022-03-01 DIAGNOSIS — C34.31 MALIGNANT NEOPLASM OF LOWER LOBE, RIGHT BRONCHUS OR LUNG: ICD-10-CM

## 2022-03-01 DIAGNOSIS — J98.4 OTHER DISORDERS OF LUNG: ICD-10-CM

## 2022-03-01 DIAGNOSIS — F03.90 UNSPECIFIED DEMENTIA, UNSPECIFIED SEVERITY, WITHOUT BEHAVIORAL DISTURBANCE, PSYCHOTIC DISTURBANCE, MOOD DISTURBANCE, AND ANXIETY: ICD-10-CM

## 2022-03-01 DIAGNOSIS — J93.82 OTHER AIR LEAK: ICD-10-CM

## 2022-03-01 DIAGNOSIS — Z79.84 LONG TERM (CURRENT) USE OF ORAL HYPOGLYCEMIC DRUGS: ICD-10-CM

## 2022-03-01 DIAGNOSIS — F20.9 SCHIZOPHRENIA, UNSPECIFIED: ICD-10-CM

## 2022-03-01 DIAGNOSIS — E78.5 HYPERLIPIDEMIA, UNSPECIFIED: ICD-10-CM

## 2022-03-01 LAB
ANION GAP SERPL CALC-SCNC: 17 MMOL/L — SIGNIFICANT CHANGE UP (ref 5–17)
APTT BLD: 27 SEC — LOW (ref 27.5–35.5)
BASE EXCESS BLDA CALC-SCNC: -1.5 MMOL/L — SIGNIFICANT CHANGE UP (ref -2–3)
BASOPHILS # BLD AUTO: 0.01 K/UL — SIGNIFICANT CHANGE UP (ref 0–0.2)
BASOPHILS NFR BLD AUTO: 0.1 % — SIGNIFICANT CHANGE UP (ref 0–2)
BLD GP AB SCN SERPL QL: NEGATIVE — SIGNIFICANT CHANGE UP
BUN SERPL-MCNC: 22 MG/DL — SIGNIFICANT CHANGE UP (ref 7–23)
CALCIUM SERPL-MCNC: 8.7 MG/DL — SIGNIFICANT CHANGE UP (ref 8.4–10.5)
CHLORIDE SERPL-SCNC: 101 MMOL/L — SIGNIFICANT CHANGE UP (ref 96–108)
CO2 BLDA-SCNC: 25 MMOL/L — HIGH (ref 19–24)
CO2 SERPL-SCNC: 23 MMOL/L — SIGNIFICANT CHANGE UP (ref 22–31)
CREAT SERPL-MCNC: 0.82 MG/DL — SIGNIFICANT CHANGE UP (ref 0.5–1.3)
EGFR: 74 ML/MIN/1.73M2 — SIGNIFICANT CHANGE UP
EOSINOPHIL # BLD AUTO: 0.01 K/UL — SIGNIFICANT CHANGE UP (ref 0–0.5)
EOSINOPHIL NFR BLD AUTO: 0.1 % — SIGNIFICANT CHANGE UP (ref 0–6)
GAS PNL BLDA: SIGNIFICANT CHANGE UP
GAS PNL BLDA: SIGNIFICANT CHANGE UP
GLUCOSE BLDC GLUCOMTR-MCNC: 121 MG/DL — HIGH (ref 70–99)
GLUCOSE BLDC GLUCOMTR-MCNC: 158 MG/DL — HIGH (ref 70–99)
GLUCOSE SERPL-MCNC: 155 MG/DL — HIGH (ref 70–99)
HCO3 BLDA-SCNC: 24 MMOL/L — SIGNIFICANT CHANGE UP (ref 21–28)
HCT VFR BLD CALC: 39.5 % — SIGNIFICANT CHANGE UP (ref 34.5–45)
HGB BLD-MCNC: 13 G/DL — SIGNIFICANT CHANGE UP (ref 11.5–15.5)
IMM GRANULOCYTES NFR BLD AUTO: 0.4 % — SIGNIFICANT CHANGE UP (ref 0–1.5)
INR BLD: 1.01 — SIGNIFICANT CHANGE UP (ref 0.88–1.16)
ISTAT ARTERIAL BE: 2 MMOL/L — SIGNIFICANT CHANGE UP (ref -2–3)
ISTAT ARTERIAL GLUCOSE: 153 MG/DL — HIGH (ref 70–99)
ISTAT ARTERIAL HCO3: 28 MMOL/L — HIGH (ref 22–26)
ISTAT ARTERIAL HEMATOCRIT: 40 % — SIGNIFICANT CHANGE UP (ref 34.5–45)
ISTAT ARTERIAL HEMOGLOBIN: 13.6 G/DL — SIGNIFICANT CHANGE UP (ref 11.5–15.5)
ISTAT ARTERIAL IONIZED CALCIUM: 1.16 MMOL/L — SIGNIFICANT CHANGE UP (ref 1.12–1.3)
ISTAT ARTERIAL PCO2: 49 MMHG — HIGH (ref 35–45)
ISTAT ARTERIAL PH: 7.37 — SIGNIFICANT CHANGE UP (ref 7.35–7.45)
ISTAT ARTERIAL PO2: 459 MMHG — HIGH (ref 80–105)
ISTAT ARTERIAL POTASSIUM: 2.9 MMOL/L — LOW (ref 3.5–5.3)
ISTAT ARTERIAL SO2: 100 % — HIGH (ref 95–98)
ISTAT ARTERIAL SODIUM: 137 MMOL/L — SIGNIFICANT CHANGE UP (ref 135–145)
ISTAT ARTERIAL TCO2: 30 MMOL/L — SIGNIFICANT CHANGE UP (ref 22–31)
LYMPHOCYTES # BLD AUTO: 1.12 K/UL — SIGNIFICANT CHANGE UP (ref 1–3.3)
LYMPHOCYTES # BLD AUTO: 11 % — LOW (ref 13–44)
MCHC RBC-ENTMCNC: 28.1 PG — SIGNIFICANT CHANGE UP (ref 27–34)
MCHC RBC-ENTMCNC: 32.9 GM/DL — SIGNIFICANT CHANGE UP (ref 32–36)
MCV RBC AUTO: 85.5 FL — SIGNIFICANT CHANGE UP (ref 80–100)
MONOCYTES # BLD AUTO: 0.32 K/UL — SIGNIFICANT CHANGE UP (ref 0–0.9)
MONOCYTES NFR BLD AUTO: 3.1 % — SIGNIFICANT CHANGE UP (ref 2–14)
NEUTROPHILS # BLD AUTO: 8.71 K/UL — HIGH (ref 1.8–7.4)
NEUTROPHILS NFR BLD AUTO: 85.3 % — HIGH (ref 43–77)
NRBC # BLD: 0 /100 WBCS — SIGNIFICANT CHANGE UP (ref 0–0)
PCO2 BLDA: 41 MMHG — HIGH (ref 32–35)
PH BLDA: 7.37 — SIGNIFICANT CHANGE UP (ref 7.35–7.45)
PLATELET # BLD AUTO: 167 K/UL — SIGNIFICANT CHANGE UP (ref 150–400)
PO2 BLDA: 400 MMHG — HIGH (ref 83–108)
POTASSIUM SERPL-MCNC: 3.1 MMOL/L — LOW (ref 3.5–5.3)
POTASSIUM SERPL-SCNC: 3.1 MMOL/L — LOW (ref 3.5–5.3)
PROTHROM AB SERPL-ACNC: 12 SEC — SIGNIFICANT CHANGE UP (ref 10.5–13.4)
RBC # BLD: 4.62 M/UL — SIGNIFICANT CHANGE UP (ref 3.8–5.2)
RBC # FLD: 13.4 % — SIGNIFICANT CHANGE UP (ref 10.3–14.5)
RH IG SCN BLD-IMP: POSITIVE — SIGNIFICANT CHANGE UP
SAO2 % BLDA: 100 % — HIGH (ref 94–98)
SODIUM SERPL-SCNC: 141 MMOL/L — SIGNIFICANT CHANGE UP (ref 135–145)
WBC # BLD: 10.21 K/UL — SIGNIFICANT CHANGE UP (ref 3.8–10.5)
WBC # FLD AUTO: 10.21 K/UL — SIGNIFICANT CHANGE UP (ref 3.8–10.5)

## 2022-03-01 PROCEDURE — S2900 ROBOTIC SURGICAL SYSTEM: CPT | Mod: NC

## 2022-03-01 PROCEDURE — 88309 TISSUE EXAM BY PATHOLOGIST: CPT | Mod: 26

## 2022-03-01 PROCEDURE — 88305 TISSUE EXAM BY PATHOLOGIST: CPT | Mod: 26

## 2022-03-01 PROCEDURE — 71045 X-RAY EXAM CHEST 1 VIEW: CPT | Mod: 26,77

## 2022-03-01 PROCEDURE — 71045 X-RAY EXAM CHEST 1 VIEW: CPT | Mod: 26

## 2022-03-01 PROCEDURE — 32652 THORACOSCOPY REM TOTL CORTEX: CPT

## 2022-03-01 PROCEDURE — 32663 THORACOSCOPY W/LOBECTOMY: CPT

## 2022-03-01 PROCEDURE — 99292 CRITICAL CARE ADDL 30 MIN: CPT

## 2022-03-01 PROCEDURE — 99291 CRITICAL CARE FIRST HOUR: CPT

## 2022-03-01 PROCEDURE — 32674 THORACOSCOPY LYMPH NODE EXC: CPT

## 2022-03-01 DEVICE — STAPLER COVIDIEN TRI-STAPLE 45MM PURPLE RELOAD: Type: IMPLANTABLE DEVICE | Status: FUNCTIONAL

## 2022-03-01 DEVICE — STAPLER COVIDIEN TRI-STAPLE 60MM PURPLE INTELLIGENT RELOAD: Type: IMPLANTABLE DEVICE | Status: FUNCTIONAL

## 2022-03-01 DEVICE — SURGICEL 4 X 8": Type: IMPLANTABLE DEVICE | Status: FUNCTIONAL

## 2022-03-01 DEVICE — CHEST DRAIN THORACIC PVC 28FR STRAIGHT: Type: IMPLANTABLE DEVICE | Status: FUNCTIONAL

## 2022-03-01 DEVICE — LIGATING CLIPS WECK HORIZON SMALL-WIDE (RED) 6: Type: IMPLANTABLE DEVICE | Status: FUNCTIONAL

## 2022-03-01 DEVICE — STAPLER COVIDIEN TRI-STAPLE 60MM PURPLE RELOAD: Type: IMPLANTABLE DEVICE | Status: FUNCTIONAL

## 2022-03-01 DEVICE — STAPLER COVIDIEN TRI-STAPLE 45MM BLACK RELOAD: Type: IMPLANTABLE DEVICE | Status: FUNCTIONAL

## 2022-03-01 DEVICE — STAPLER COVIDIEN TRI-STAPLE 45MM PURPLE INTELLIGENT RELOAD: Type: IMPLANTABLE DEVICE | Status: FUNCTIONAL

## 2022-03-01 DEVICE — STAPLER COVIDIEN TRI-STAPLE 45MM TAN RELOAD: Type: IMPLANTABLE DEVICE | Status: FUNCTIONAL

## 2022-03-01 DEVICE — STAPLER COVIDIEN TRI-STAPLE CURVED 45MM TAN RELOAD: Type: IMPLANTABLE DEVICE | Status: FUNCTIONAL

## 2022-03-01 DEVICE — STAPLER COVIDIEN TRI-STAPLE CURVED 60MM PURPLE RELOAD: Type: IMPLANTABLE DEVICE | Status: FUNCTIONAL

## 2022-03-01 DEVICE — STAPLER COVIDIEN TRI-STAPLE CURVED 45MM PURPLE RELOAD: Type: IMPLANTABLE DEVICE | Status: FUNCTIONAL

## 2022-03-01 DEVICE — CHEST DRAIN THORACIC PVC 28FR RIGHT ANGLE: Type: IMPLANTABLE DEVICE | Status: FUNCTIONAL

## 2022-03-01 DEVICE — STAPLER COVIDIEN TRI-STAPLE CURVED 60MM TAN RELOAD: Type: IMPLANTABLE DEVICE | Status: FUNCTIONAL

## 2022-03-01 DEVICE — STAPLER COVIDIEN TRI-STAPLE CURVED 30MM TAN RELOAD: Type: IMPLANTABLE DEVICE | Status: FUNCTIONAL

## 2022-03-01 DEVICE — LIGATING CLIPS WECK HEMOLOK POLYMER MEDIUM-LARGE (GREEN) 6: Type: IMPLANTABLE DEVICE | Status: FUNCTIONAL

## 2022-03-01 DEVICE — STAPLER COVIDIEN TRI-STAPLE 60MM BLACK RELOAD: Type: IMPLANTABLE DEVICE | Status: FUNCTIONAL

## 2022-03-01 RX ORDER — ATORVASTATIN CALCIUM 80 MG/1
10 TABLET, FILM COATED ORAL AT BEDTIME
Refills: 0 | Status: DISCONTINUED | OUTPATIENT
Start: 2022-03-01 | End: 2022-03-07

## 2022-03-01 RX ORDER — DEXTROSE 50 % IN WATER 50 %
12.5 SYRINGE (ML) INTRAVENOUS ONCE
Refills: 0 | Status: DISCONTINUED | OUTPATIENT
Start: 2022-03-01 | End: 2022-03-03

## 2022-03-01 RX ORDER — DONEPEZIL HYDROCHLORIDE 10 MG/1
5 TABLET, FILM COATED ORAL AT BEDTIME
Refills: 0 | Status: DISCONTINUED | OUTPATIENT
Start: 2022-03-01 | End: 2022-03-07

## 2022-03-01 RX ORDER — DEXTROSE 50 % IN WATER 50 %
25 SYRINGE (ML) INTRAVENOUS ONCE
Refills: 0 | Status: DISCONTINUED | OUTPATIENT
Start: 2022-03-01 | End: 2022-03-03

## 2022-03-01 RX ORDER — LEVETIRACETAM 250 MG/1
1 TABLET, FILM COATED ORAL
Qty: 0 | Refills: 0 | DISCHARGE

## 2022-03-01 RX ORDER — RALOXIFENE HYDROCHLORIDE 60 MG/1
1 TABLET, COATED ORAL
Qty: 0 | Refills: 0 | DISCHARGE

## 2022-03-01 RX ORDER — QUETIAPINE FUMARATE 200 MG/1
0 TABLET, FILM COATED ORAL
Qty: 0 | Refills: 0 | DISCHARGE

## 2022-03-01 RX ORDER — SODIUM CHLORIDE 9 MG/ML
1000 INJECTION, SOLUTION INTRAVENOUS
Refills: 0 | Status: DISCONTINUED | OUTPATIENT
Start: 2022-03-01 | End: 2022-03-03

## 2022-03-01 RX ORDER — CEFAZOLIN SODIUM 1 G
2000 VIAL (EA) INJECTION EVERY 8 HOURS
Refills: 0 | Status: COMPLETED | OUTPATIENT
Start: 2022-03-01 | End: 2022-03-02

## 2022-03-01 RX ORDER — HEPARIN SODIUM 5000 [USP'U]/ML
5000 INJECTION INTRAVENOUS; SUBCUTANEOUS EVERY 8 HOURS
Refills: 0 | Status: DISCONTINUED | OUTPATIENT
Start: 2022-03-02 | End: 2022-03-07

## 2022-03-01 RX ORDER — MORPHINE SULFATE 50 MG/1
2 CAPSULE, EXTENDED RELEASE ORAL EVERY 6 HOURS
Refills: 0 | Status: DISCONTINUED | OUTPATIENT
Start: 2022-03-01 | End: 2022-03-03

## 2022-03-01 RX ORDER — LIDOCAINE 4 G/100G
1 CREAM TOPICAL DAILY
Refills: 0 | Status: DISCONTINUED | OUTPATIENT
Start: 2022-03-01 | End: 2022-03-07

## 2022-03-01 RX ORDER — RALOXIFENE HYDROCHLORIDE 60 MG/1
60 TABLET, COATED ORAL DAILY
Refills: 0 | Status: DISCONTINUED | OUTPATIENT
Start: 2022-03-01 | End: 2022-03-07

## 2022-03-01 RX ORDER — FAMOTIDINE 10 MG/ML
20 INJECTION INTRAVENOUS EVERY 12 HOURS
Refills: 0 | Status: DISCONTINUED | OUTPATIENT
Start: 2022-03-01 | End: 2022-03-03

## 2022-03-01 RX ORDER — EMPAGLIFLOZIN 10 MG/1
1 TABLET, FILM COATED ORAL
Qty: 0 | Refills: 0 | DISCHARGE

## 2022-03-01 RX ORDER — GLUCAGON INJECTION, SOLUTION 0.5 MG/.1ML
1 INJECTION, SOLUTION SUBCUTANEOUS ONCE
Refills: 0 | Status: DISCONTINUED | OUTPATIENT
Start: 2022-03-01 | End: 2022-03-03

## 2022-03-01 RX ORDER — ACETAMINOPHEN 500 MG
1000 TABLET ORAL ONCE
Refills: 0 | Status: COMPLETED | OUTPATIENT
Start: 2022-03-01 | End: 2022-03-01

## 2022-03-01 RX ORDER — CALCIUM CARBONATE 500(1250)
1 TABLET ORAL
Qty: 0 | Refills: 0 | DISCHARGE

## 2022-03-01 RX ORDER — LOSARTAN POTASSIUM 100 MG/1
1 TABLET, FILM COATED ORAL
Qty: 0 | Refills: 0 | DISCHARGE

## 2022-03-01 RX ORDER — QUETIAPINE FUMARATE 200 MG/1
100 TABLET, FILM COATED ORAL AT BEDTIME
Refills: 0 | Status: DISCONTINUED | OUTPATIENT
Start: 2022-03-01 | End: 2022-03-07

## 2022-03-01 RX ORDER — LEVETIRACETAM 250 MG/1
500 TABLET, FILM COATED ORAL
Refills: 0 | Status: DISCONTINUED | OUTPATIENT
Start: 2022-03-01 | End: 2022-03-07

## 2022-03-01 RX ORDER — POTASSIUM CHLORIDE 20 MEQ
20 PACKET (EA) ORAL
Refills: 0 | Status: DISCONTINUED | OUTPATIENT
Start: 2022-03-01 | End: 2022-03-01

## 2022-03-01 RX ORDER — DONEPEZIL HYDROCHLORIDE 10 MG/1
1 TABLET, FILM COATED ORAL
Qty: 0 | Refills: 0 | DISCHARGE

## 2022-03-01 RX ORDER — LOSARTAN/HYDROCHLOROTHIAZIDE 100MG-25MG
1 TABLET ORAL
Qty: 0 | Refills: 0 | DISCHARGE

## 2022-03-01 RX ORDER — DEXTROSE 50 % IN WATER 50 %
15 SYRINGE (ML) INTRAVENOUS ONCE
Refills: 0 | Status: DISCONTINUED | OUTPATIENT
Start: 2022-03-01 | End: 2022-03-03

## 2022-03-01 RX ORDER — ACETAMINOPHEN 500 MG
975 TABLET ORAL ONCE
Refills: 0 | Status: COMPLETED | OUTPATIENT
Start: 2022-03-01 | End: 2022-03-01

## 2022-03-01 RX ORDER — BUPIVACAINE 13.3 MG/ML
20 INJECTION, SUSPENSION, LIPOSOMAL INFILTRATION ONCE
Refills: 0 | Status: DISCONTINUED | OUTPATIENT
Start: 2022-03-01 | End: 2022-03-03

## 2022-03-01 RX ORDER — QUETIAPINE FUMARATE 200 MG/1
1 TABLET, FILM COATED ORAL
Qty: 0 | Refills: 0 | DISCHARGE

## 2022-03-01 RX ORDER — SENNA PLUS 8.6 MG/1
2 TABLET ORAL AT BEDTIME
Refills: 0 | Status: DISCONTINUED | OUTPATIENT
Start: 2022-03-01 | End: 2022-03-07

## 2022-03-01 RX ORDER — CHLORHEXIDINE GLUCONATE 213 G/1000ML
15 SOLUTION TOPICAL EVERY 12 HOURS
Refills: 0 | Status: DISCONTINUED | OUTPATIENT
Start: 2022-03-01 | End: 2022-03-02

## 2022-03-01 RX ORDER — INSULIN LISPRO 100/ML
VIAL (ML) SUBCUTANEOUS
Refills: 0 | Status: DISCONTINUED | OUTPATIENT
Start: 2022-03-01 | End: 2022-03-03

## 2022-03-01 RX ORDER — ATORVASTATIN CALCIUM 80 MG/1
1 TABLET, FILM COATED ORAL
Qty: 0 | Refills: 0 | DISCHARGE

## 2022-03-01 RX ORDER — METOPROLOL TARTRATE 50 MG
5 TABLET ORAL ONCE
Refills: 0 | Status: COMPLETED | OUTPATIENT
Start: 2022-03-01 | End: 2022-03-01

## 2022-03-01 RX ORDER — HEPARIN SODIUM 5000 [USP'U]/ML
5000 INJECTION INTRAVENOUS; SUBCUTANEOUS ONCE
Refills: 0 | Status: COMPLETED | OUTPATIENT
Start: 2022-03-01 | End: 2022-03-01

## 2022-03-01 RX ORDER — ACETAMINOPHEN 500 MG
1000 TABLET ORAL ONCE
Refills: 0 | Status: COMPLETED | OUTPATIENT
Start: 2022-03-01 | End: 2022-03-02

## 2022-03-01 RX ADMIN — Medication 5 MILLIGRAM(S): at 17:59

## 2022-03-01 RX ADMIN — CHLORHEXIDINE GLUCONATE 15 MILLILITER(S): 213 SOLUTION TOPICAL at 17:17

## 2022-03-01 RX ADMIN — HEPARIN SODIUM 5000 UNIT(S): 5000 INJECTION INTRAVENOUS; SUBCUTANEOUS at 07:19

## 2022-03-01 RX ADMIN — Medication 2: at 17:22

## 2022-03-01 RX ADMIN — SODIUM CHLORIDE 3 MILLILITER(S): 9 INJECTION INTRAMUSCULAR; INTRAVENOUS; SUBCUTANEOUS at 14:41

## 2022-03-01 RX ADMIN — Medication 400 MILLIGRAM(S): at 14:46

## 2022-03-01 RX ADMIN — Medication 1000 MILLIGRAM(S): at 15:30

## 2022-03-01 RX ADMIN — Medication 100 MILLIGRAM(S): at 17:17

## 2022-03-01 RX ADMIN — Medication 975 MILLIGRAM(S): at 07:19

## 2022-03-01 NOTE — BRIEF OPERATIVE NOTE - NSICDXBRIEFPROCEDURE_GEN_ALL_CORE_FT
PROCEDURES:  Lobectomy, lung, robot-assisted, using VATS 01-Mar-2022 12:40:28 R VATS RA Right Lower Lobectomy Rosario Doran

## 2022-03-01 NOTE — PROGRESS NOTE ADULT - SUBJECTIVE AND OBJECTIVE BOX
INTERVAL HPI/OVERNIGHT EVENTS:    OpDay: Rt VATS - RLL lobectomy    77yo Hx DM, HTN, HLD, osteoporosis, dementia, schizophrenia with recent CT chest 1/9/22 revealing RLL spiculated mass.    CT Chest reportedly completed as part of pre-op workup for cataract surgery.     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    to OR today     no intraop blood products  lethargic at completion of procedure - not ready for extubation  presented to ICU post-procedure - intubated; no infusions      ICU Vital Signs Last 24 Hrs  T(C): 36.1 (01 Mar 2022 13:46), Max: 36.1 (01 Mar 2022 13:46)  T(F): 96.9 (01 Mar 2022 13:46), Max: 96.9 (01 Mar 2022 13:46)  HR: 74 (01 Mar 2022 15:00) (73 - 77) sinus with BBB  BP: 165/70 (01 Mar 2022 14:30) (165/70 - 165/70)  BP(mean): 100 (01 Mar 2022 14:30) (100 - 100)  ABP: 166/73 (01 Mar 2022 15:00) (146/61 - 175/78)  ABP(mean): 108 (01 Mar 2022 15:00) (92 - 116)  RR: 12 (01 Mar 2022 15:00) (12 - 12)  SpO2: 99% (01 Mar 2022 15:00) (99% - 100%) Fi04 40%    Qtts: None     I&O's Summary    01 Mar 2022 07:01  -  01 Mar 2022 15:23  --------------------------------------------------------  IN: 130 mL / OUT: 100 mL / NET: 30 mL    Vent settings: AC 12/410/40/5    Physical Exam    Heart - regualr (-)rub/gallop  Lungs - BS appreciated bilaterally - no rhonchi/wheeze  Abd - (+)BS soft NTND(-)r/r/g  Ext - warm to touch; no cyanosis/clubbing/edema  Neuro - pupils reactive to light; otherwise unable at this time   Skin - no rash     LABS:                        13.0   10.21 )-----------( 167      ( 01 Mar 2022 14:00 )             39.5     03-01    141  |  101  |  22  ----------------------------<  155<H>  3.1<L>   |  23  |  0.82    Ca    8.7      01 Mar 2022 14:00      PT/INR - ( 01 Mar 2022 14:00 )   PT: 12.0 sec;   INR: 1.01     PTT - ( 01 Mar 2022 14:00 )  PTT:27.0 sec    ABG - ( 01 Mar 2022 13:58 ) 7.37/41/400/100    RADIOLOGY & ADDITIONAL STUDIES: reviewed     Patient with multiple medical problems found to have a spiculated mass in preop assess ment for planned cataract procedure now s/p VATS/RLL lobectomy with post-op lethargy precluding extubation post-procedure    serial ABG to optimize oxygenation adn ventilation   avoidance of sedative/narcotic agents -   hope to liberate from vent soon   restart psych meds     d/w anesthesia/staff and Thoracic     I have spent/provided stated minutes of critical care time to this patient: 90

## 2022-03-02 LAB
A1C WITH ESTIMATED AVERAGE GLUCOSE RESULT: 6.8 % — HIGH (ref 4–5.6)
ANION GAP SERPL CALC-SCNC: 11 MMOL/L — SIGNIFICANT CHANGE UP (ref 5–17)
APTT BLD: 25 SEC — LOW (ref 27.5–35.5)
BUN SERPL-MCNC: 23 MG/DL — SIGNIFICANT CHANGE UP (ref 7–23)
CALCIUM SERPL-MCNC: 8.8 MG/DL — SIGNIFICANT CHANGE UP (ref 8.4–10.5)
CHLORIDE SERPL-SCNC: 102 MMOL/L — SIGNIFICANT CHANGE UP (ref 96–108)
CO2 SERPL-SCNC: 26 MMOL/L — SIGNIFICANT CHANGE UP (ref 22–31)
CREAT SERPL-MCNC: 0.83 MG/DL — SIGNIFICANT CHANGE UP (ref 0.5–1.3)
EGFR: 73 ML/MIN/1.73M2 — SIGNIFICANT CHANGE UP
ESTIMATED AVERAGE GLUCOSE: 148 MG/DL — HIGH (ref 68–114)
GAS PNL BLDA: SIGNIFICANT CHANGE UP
GLUCOSE BLDC GLUCOMTR-MCNC: 113 MG/DL — HIGH (ref 70–99)
GLUCOSE BLDC GLUCOMTR-MCNC: 127 MG/DL — HIGH (ref 70–99)
GLUCOSE BLDC GLUCOMTR-MCNC: 128 MG/DL — HIGH (ref 70–99)
GLUCOSE SERPL-MCNC: 147 MG/DL — HIGH (ref 70–99)
HCT VFR BLD CALC: 41.1 % — SIGNIFICANT CHANGE UP (ref 34.5–45)
HGB BLD-MCNC: 13.4 G/DL — SIGNIFICANT CHANGE UP (ref 11.5–15.5)
INR BLD: 1.08 — SIGNIFICANT CHANGE UP (ref 0.88–1.16)
MAGNESIUM SERPL-MCNC: 2 MG/DL — SIGNIFICANT CHANGE UP (ref 1.6–2.6)
MCHC RBC-ENTMCNC: 27.5 PG — SIGNIFICANT CHANGE UP (ref 27–34)
MCHC RBC-ENTMCNC: 32.6 GM/DL — SIGNIFICANT CHANGE UP (ref 32–36)
MCV RBC AUTO: 84.4 FL — SIGNIFICANT CHANGE UP (ref 80–100)
NRBC # BLD: 0 /100 WBCS — SIGNIFICANT CHANGE UP (ref 0–0)
PLATELET # BLD AUTO: 207 K/UL — SIGNIFICANT CHANGE UP (ref 150–400)
POTASSIUM SERPL-MCNC: 3.8 MMOL/L — SIGNIFICANT CHANGE UP (ref 3.5–5.3)
POTASSIUM SERPL-SCNC: 3.8 MMOL/L — SIGNIFICANT CHANGE UP (ref 3.5–5.3)
PROTHROM AB SERPL-ACNC: 12.9 SEC — SIGNIFICANT CHANGE UP (ref 10.5–13.4)
RBC # BLD: 4.87 M/UL — SIGNIFICANT CHANGE UP (ref 3.8–5.2)
RBC # FLD: 13.4 % — SIGNIFICANT CHANGE UP (ref 10.3–14.5)
SODIUM SERPL-SCNC: 139 MMOL/L — SIGNIFICANT CHANGE UP (ref 135–145)
WBC # BLD: 14.22 K/UL — HIGH (ref 3.8–10.5)
WBC # FLD AUTO: 14.22 K/UL — HIGH (ref 3.8–10.5)

## 2022-03-02 PROCEDURE — 99292 CRITICAL CARE ADDL 30 MIN: CPT

## 2022-03-02 PROCEDURE — 71045 X-RAY EXAM CHEST 1 VIEW: CPT | Mod: 26

## 2022-03-02 PROCEDURE — 99291 CRITICAL CARE FIRST HOUR: CPT

## 2022-03-02 RX ORDER — ACETAMINOPHEN 500 MG
1000 TABLET ORAL ONCE
Refills: 0 | Status: COMPLETED | OUTPATIENT
Start: 2022-03-02 | End: 2022-03-02

## 2022-03-02 RX ORDER — SODIUM CHLORIDE 9 MG/ML
3 INJECTION INTRAMUSCULAR; INTRAVENOUS; SUBCUTANEOUS EVERY 8 HOURS
Refills: 0 | Status: DISCONTINUED | OUTPATIENT
Start: 2022-03-02 | End: 2022-03-07

## 2022-03-02 RX ADMIN — Medication 400 MILLIGRAM(S): at 15:00

## 2022-03-02 RX ADMIN — Medication 400 MILLIGRAM(S): at 09:45

## 2022-03-02 RX ADMIN — Medication 100 MILLIGRAM(S): at 14:00

## 2022-03-02 RX ADMIN — Medication 100 MILLIGRAM(S): at 06:45

## 2022-03-02 RX ADMIN — LEVETIRACETAM 500 MILLIGRAM(S): 250 TABLET, FILM COATED ORAL at 18:59

## 2022-03-02 RX ADMIN — FAMOTIDINE 20 MILLIGRAM(S): 10 INJECTION INTRAVENOUS at 06:49

## 2022-03-02 RX ADMIN — SENNA PLUS 2 TABLET(S): 8.6 TABLET ORAL at 21:45

## 2022-03-02 RX ADMIN — HEPARIN SODIUM 5000 UNIT(S): 5000 INJECTION INTRAVENOUS; SUBCUTANEOUS at 06:48

## 2022-03-02 RX ADMIN — SODIUM CHLORIDE 3 MILLILITER(S): 9 INJECTION INTRAMUSCULAR; INTRAVENOUS; SUBCUTANEOUS at 22:05

## 2022-03-02 RX ADMIN — DONEPEZIL HYDROCHLORIDE 5 MILLIGRAM(S): 10 TABLET, FILM COATED ORAL at 21:47

## 2022-03-02 RX ADMIN — ATORVASTATIN CALCIUM 10 MILLIGRAM(S): 80 TABLET, FILM COATED ORAL at 21:45

## 2022-03-02 RX ADMIN — QUETIAPINE FUMARATE 100 MILLIGRAM(S): 200 TABLET, FILM COATED ORAL at 21:46

## 2022-03-02 RX ADMIN — HEPARIN SODIUM 5000 UNIT(S): 5000 INJECTION INTRAVENOUS; SUBCUTANEOUS at 14:49

## 2022-03-02 RX ADMIN — LIDOCAINE 1 PATCH: 4 CREAM TOPICAL at 12:53

## 2022-03-02 RX ADMIN — HEPARIN SODIUM 5000 UNIT(S): 5000 INJECTION INTRAVENOUS; SUBCUTANEOUS at 21:47

## 2022-03-02 RX ADMIN — LIDOCAINE 1 PATCH: 4 CREAM TOPICAL at 19:20

## 2022-03-02 RX ADMIN — LEVETIRACETAM 500 MILLIGRAM(S): 250 TABLET, FILM COATED ORAL at 06:49

## 2022-03-02 RX ADMIN — FAMOTIDINE 20 MILLIGRAM(S): 10 INJECTION INTRAVENOUS at 18:59

## 2022-03-02 RX ADMIN — SODIUM CHLORIDE 3 MILLILITER(S): 9 INJECTION INTRAMUSCULAR; INTRAVENOUS; SUBCUTANEOUS at 14:19

## 2022-03-02 RX ADMIN — RALOXIFENE HYDROCHLORIDE 60 MILLIGRAM(S): 60 TABLET, COATED ORAL at 16:59

## 2022-03-02 NOTE — PROGRESS NOTE ADULT - SUBJECTIVE AND OBJECTIVE BOX
Patient discussed on morning rounds with Dr. Hunter    Operation / Date: 3/2/22 RVATs, RA right lower lobectomy, MLND    SUBJECTIVE ASSESSMENT:  76y Female seen and examined at bedside.          Vital Signs Last 24 Hrs  T(C): 37.1 (02 Mar 2022 10:00), Max: 37.1 (02 Mar 2022 05:17)  T(F): 98.7 (02 Mar 2022 10:00), Max: 98.7 (02 Mar 2022 05:17)  HR: 78 (02 Mar 2022 10:01) (67 - 108)  BP: 135/63 (02 Mar 2022 10:01) (124/58 - 165/70)  BP(mean): 91 (02 Mar 2022 10:01) (80 - 100)  RR: 18 (02 Mar 2022 10:01) (12 - 20)  SpO2: 91% (02 Mar 2022 10:01) (91% - 100%)  I&O's Detail    01 Mar 2022 07:01  -  02 Mar 2022 07:00  --------------------------------------------------------  IN:    IV PiggyBack: 400 mL    Lactated Ringers: 255 mL    Oral Fluid: 240 mL  Total IN: 895 mL    OUT:    Chest Tube (mL): 212 mL    Chest Tube (mL): 35 mL    Indwelling Catheter - Urethral (mL): 1275 mL  Total OUT: 1522 mL    Total NET: -627 mL      02 Mar 2022 07:01  -  02 Mar 2022 11:46  --------------------------------------------------------  IN:    Lactated Ringers: 75 mL  Total IN: 75 mL    OUT:    Chest Tube (mL): 0 mL    Chest Tube (mL): 10 mL    Indwelling Catheter - Urethral (mL): 180 mL  Total OUT: 190 mL    Total NET: -115 mL          CHEST TUBE:  Yes/No. AIR LEAKS: Yes/No. Suction / H2O SEAL.   GABBY DRAIN:  Yes/No.  EPICARDIAL WIRES: Yes/No.  TIE DOWNS: Yes/No.  ROD: Yes/No.    PHYSICAL EXAM:    General:     Neurological:    Cardiovascular:    Respiratory:    Gastrointestinal:    Extremities:    Vascular:    Incision Sites:    LABS:                        13.4   14.22 )-----------( 207      ( 02 Mar 2022 04:35 )             41.1       COUMADIN:  Yes/No. REASON: .    PT/INR - ( 02 Mar 2022 04:35 )   PT: 12.9 sec;   INR: 1.08          PTT - ( 02 Mar 2022 04:35 )  PTT:25.0 sec    03-02    139  |  102  |  23  ----------------------------<  147<H>  3.8   |  26  |  0.83    Ca    8.8      02 Mar 2022 04:35  Mg     2.0     03-02            MEDICATIONS  (STANDING):  atorvastatin 10 milliGRAM(s) Oral at bedtime  BUpivacaine liposome 1.3% Injectable (no eMAR) 20 milliLiter(s) Local Injection once  ceFAZolin   IVPB 2000 milliGRAM(s) IV Intermittent every 8 hours  dextrose 40% Gel 15 Gram(s) Oral once  dextrose 5%. 1000 milliLiter(s) (50 mL/Hr) IV Continuous <Continuous>  dextrose 5%. 1000 milliLiter(s) (100 mL/Hr) IV Continuous <Continuous>  dextrose 50% Injectable 25 Gram(s) IV Push once  dextrose 50% Injectable 12.5 Gram(s) IV Push once  dextrose 50% Injectable 25 Gram(s) IV Push once  donepezil 5 milliGRAM(s) Oral at bedtime  famotidine    Tablet 20 milliGRAM(s) Oral every 12 hours  glucagon  Injectable 1 milliGRAM(s) IntraMuscular once  heparin   Injectable 5000 Unit(s) SubCutaneous every 8 hours  insulin lispro (ADMELOG) corrective regimen sliding scale   SubCutaneous Before meals and at bedtime  lactated ringers. 1000 milliLiter(s) (75 mL/Hr) IV Continuous <Continuous>  levETIRAcetam 500 milliGRAM(s) Oral two times a day  lidocaine   4% Patch 1 Patch Transdermal daily  QUEtiapine 100 milliGRAM(s) Oral at bedtime  raloxifene 60 milliGRAM(s) Oral daily  senna 2 Tablet(s) Oral at bedtime  sodium chloride 0.9% lock flush 3 milliLiter(s) IV Push every 8 hours    MEDICATIONS  (PRN):  morphine  - Injectable 2 milliGRAM(s) IV Push every 6 hours PRN Severe Pain (7 - 10)        RADIOLOGY & ADDITIONAL TESTS:     Patient discussed on morning rounds with Dr. Hunter    Operation / Date: 3/2/22 RVATs, RA right lower lobectomy, MLND    SUBJECTIVE ASSESSMENT:  76y Female seen and examined at bedside.  Patient pulled out robles.  Son providing translation.  She said she felt like she needs to use bathroom.      Vital Signs Last 24 Hrs  T(C): 37.1 (02 Mar 2022 10:00), Max: 37.1 (02 Mar 2022 05:17)  T(F): 98.7 (02 Mar 2022 10:00), Max: 98.7 (02 Mar 2022 05:17)  HR: 78 (02 Mar 2022 10:01) (67 - 108)  BP: 135/63 (02 Mar 2022 10:01) (124/58 - 165/70)  BP(mean): 91 (02 Mar 2022 10:01) (80 - 100)  RR: 18 (02 Mar 2022 10:01) (12 - 20)  SpO2: 91% (02 Mar 2022 10:01) (91% - 100%)  I&O's Detail    01 Mar 2022 07:01  -  02 Mar 2022 07:00  --------------------------------------------------------  IN:    IV PiggyBack: 400 mL    Lactated Ringers: 255 mL    Oral Fluid: 240 mL  Total IN: 895 mL    OUT:    Chest Tube (mL): 212 mL    Chest Tube (mL): 35 mL    Indwelling Catheter - Urethral (mL): 1275 mL  Total OUT: 1522 mL    Total NET: -627 mL      02 Mar 2022 07:01  -  02 Mar 2022 11:46  --------------------------------------------------------  IN:    Lactated Ringers: 75 mL  Total IN: 75 mL    OUT:    Chest Tube (mL): 0 mL    Chest Tube (mL): 10 mL    Indwelling Catheter - Urethral (mL): 180 mL  Total OUT: 190 mL    Total NET: -115 mL          CHEST TUBE:  Yes/ AIR LEAKS: Yes. Suction  AGBBY DRAIN:  No  EpICARDIAL WIRES: No.  TIE DOWNS: No.  ROBLES: No.    PHYSICAL EXAM:    GEN: NAD, looks comfortable  Neuro: A&Ox2.  No focal deficits.  Moving all extremities.   HEENT: No obvious abnormalities  CV: S1S2, regular, no murmurs appreciated.  No carotid bruits.  No JVD  Lungs: Clear B/L.  No wheezing, rales or rhonchi  ABD: Soft, non-tender, non-distended.  +Bowel sounds  EXT: Warm and well perfused.  No peripheral edema noted  Musculoskeletal: Moving all extremities with normal ROM, no joint swelling  PV: Pedal pulses palpable   Incision: Right VATS incisions CDI    LABS:                        13.4   14.22 )-----------( 207      ( 02 Mar 2022 04:35 )             41.1       COUMADIN:  No. REASON: .    PT/INR - ( 02 Mar 2022 04:35 )   PT: 12.9 sec;   INR: 1.08          PTT - ( 02 Mar 2022 04:35 )  PTT:25.0 sec    03-02    139  |  102  |  23  ----------------------------<  147<H>  3.8   |  26  |  0.83    Ca    8.8      02 Mar 2022 04:35  Mg     2.0     03-02            MEDICATIONS  (STANDING):  atorvastatin 10 milliGRAM(s) Oral at bedtime  BUpivacaine liposome 1.3% Injectable (no eMAR) 20 milliLiter(s) Local Injection once  ceFAZolin   IVPB 2000 milliGRAM(s) IV Intermittent every 8 hours  dextrose 40% Gel 15 Gram(s) Oral once  dextrose 5%. 1000 milliLiter(s) (50 mL/Hr) IV Continuous <Continuous>  dextrose 5%. 1000 milliLiter(s) (100 mL/Hr) IV Continuous <Continuous>  dextrose 50% Injectable 25 Gram(s) IV Push once  dextrose 50% Injectable 12.5 Gram(s) IV Push once  dextrose 50% Injectable 25 Gram(s) IV Push once  donepezil 5 milliGRAM(s) Oral at bedtime  famotidine    Tablet 20 milliGRAM(s) Oral every 12 hours  glucagon  Injectable 1 milliGRAM(s) IntraMuscular once  heparin   Injectable 5000 Unit(s) SubCutaneous every 8 hours  insulin lispro (ADMELOG) corrective regimen sliding scale   SubCutaneous Before meals and at bedtime  lactated ringers. 1000 milliLiter(s) (75 mL/Hr) IV Continuous <Continuous>  levETIRAcetam 500 milliGRAM(s) Oral two times a day  lidocaine   4% Patch 1 Patch Transdermal daily  QUEtiapine 100 milliGRAM(s) Oral at bedtime  raloxifene 60 milliGRAM(s) Oral daily  senna 2 Tablet(s) Oral at bedtime  sodium chloride 0.9% lock flush 3 milliLiter(s) IV Push every 8 hours    MEDICATIONS  (PRN):  morphine  - Injectable 2 milliGRAM(s) IV Push every 6 hours PRN Severe Pain (7 - 10)        RADIOLOGY & ADDITIONAL TESTS:

## 2022-03-02 NOTE — PROGRESS NOTE ADULT - SUBJECTIVE AND OBJECTIVE BOX
CTICU  CRITICAL  CARE  attending     Hand off received 					   Pertinent clinical, laboratory, radiographic, hemodynamic, echocardiographic, respiratory data, microbiologic data and chart were reviewed and analyzed frequently throughout the course of the day and night  Patient seen and examined with CTS/ SH attending at bedside  Pt is a 76y , Female, HEALTH ISSUES - PROBLEM Dx:      , FAMILY HISTORY:  No pertinent family history in first degree relatives    PAST MEDICAL & SURGICAL HISTORY:  Type 2 diabetes mellitus        Hypertension    Hyperlipidemia    Schizophrenia    Osteoporosis    No significant past surgical history      Patient is a 76y old  Female who presents with a chief complaint of elective surgery: Right VATS, robotic assisted, Right lower lobectomy, with MLND (02 Mar 2022 11:39)      14 system review limited by mentation and multiorgan morbidity     Vital signs, hemodynamic and respiratory parameters were reviewed from the bedside nursing flowsheet.  ICU Vital Signs Last 24 Hrs  T(C): 36.7 (02 Mar 2022 17:11), Max: 37.1 (02 Mar 2022 05:17)  T(F): 98 (02 Mar 2022 17:11), Max: 98.7 (02 Mar 2022 05:17)  HR: 74 (02 Mar 2022 16:00) (68 - 102)  BP: 137/64 (02 Mar 2022 16:00) (124/58 - 151/68)  BP(mean): 88 (02 Mar 2022 16:00) (80 - 99)  ABP: 136/61 (02 Mar 2022 08:00) (102/39 - 185/78)  ABP(mean): 86 (02 Mar 2022 08:00) (61 - 119)  RR: 18 (02 Mar 2022 16:00) (17 - 20)  SpO2: 97% (02 Mar 2022 16:00) (91% - 99%)    Adult Advanced Hemodynamics Last 24 Hrs  CVP(mm Hg): --  CVP(cm H2O): --  CO: --  CI: --  PA: --  PA(mean): --  PCWP: --  SVR: --  SVRI: --  PVR: --  PVRI: --, ABG - ( 02 Mar 2022 04:32 )  pH, Arterial: 7.41  pH, Blood: x     /  pCO2: 43    /  pO2: 158   / HCO3: 27    / Base Excess: 2.2   /  SaO2: 99.4                Intake and output was reviewed and the fluid balance was calculated  Daily     Daily   I&O's Summary    01 Mar 2022 07:01  -  02 Mar 2022 07:00  --------------------------------------------------------  IN: 895 mL / OUT: 1522 mL / NET: -627 mL    02 Mar 2022 07:01  -  02 Mar 2022 21:02  --------------------------------------------------------  IN: 75 mL / OUT: 332 mL / NET: -257 mL        All lines and drain sites were assessed  [facility]      POCT Blood Glucose.: 113 mg/dL (02 Mar 2022 17:13)    No acute change in focality  Auscultation of the chest reveals equal bs  Abdomen is soft  Extremities are warm and well perfused  Wounds appear clean and unremarkable  Antibiotics are periop    labs  CBC Full  -  ( 02 Mar 2022 04:35 )  WBC Count : 14.22 K/uL  RBC Count : 4.87 M/uL  Hemoglobin : 13.4 g/dL  Hematocrit : 41.1 %  Platelet Count - Automated : 207 K/uL  Mean Cell Volume : 84.4 fl  Mean Cell Hemoglobin : 27.5 pg  Mean Cell Hemoglobin Concentration : 32.6 gm/dL  Auto Neutrophil # : x  Auto Lymphocyte # : x  Auto Monocyte # : x  Auto Eosinophil # : x  Auto Basophil # : x  Auto Neutrophil % : x  Auto Lymphocyte % : x  Auto Monocyte % : x  Auto Eosinophil % : x  Auto Basophil % : x    03-02    139  |  102  |  23  ----------------------------<  147<H>  3.8   |  26  |  0.83    Ca    8.8      02 Mar 2022 04:35  Mg     2.0     03-02      PT/INR - ( 02 Mar 2022 04:35 )   PT: 12.9 sec;   INR: 1.08          PTT - ( 02 Mar 2022 04:35 )  PTT:25.0 sec  The current medications were reviewed   MEDICATIONS  (STANDING):  atorvastatin 10 milliGRAM(s) Oral at bedtime  BUpivacaine liposome 1.3% Injectable (no eMAR) 20 milliLiter(s) Local Injection once  dextrose 40% Gel 15 Gram(s) Oral once  dextrose 5%. 1000 milliLiter(s) (50 mL/Hr) IV Continuous <Continuous>  dextrose 5%. 1000 milliLiter(s) (100 mL/Hr) IV Continuous <Continuous>  dextrose 50% Injectable 25 Gram(s) IV Push once  dextrose 50% Injectable 12.5 Gram(s) IV Push once  dextrose 50% Injectable 25 Gram(s) IV Push once  donepezil 5 milliGRAM(s) Oral at bedtime  famotidine    Tablet 20 milliGRAM(s) Oral every 12 hours  glucagon  Injectable 1 milliGRAM(s) IntraMuscular once  heparin   Injectable 5000 Unit(s) SubCutaneous every 8 hours  insulin lispro (ADMELOG) corrective regimen sliding scale   SubCutaneous Before meals and at bedtime  lactated ringers. 1000 milliLiter(s) (75 mL/Hr) IV Continuous <Continuous>  levETIRAcetam 500 milliGRAM(s) Oral two times a day  lidocaine   4% Patch 1 Patch Transdermal daily  QUEtiapine 100 milliGRAM(s) Oral at bedtime  raloxifene 60 milliGRAM(s) Oral daily  senna 2 Tablet(s) Oral at bedtime  sodium chloride 0.9% lock flush 3 milliLiter(s) IV Push every 8 hours    MEDICATIONS  (PRN):  morphine  - Injectable 2 milliGRAM(s) IV Push every 6 hours PRN Severe Pain (7 - 10)       PROBLEM LIST/ ASSESSMENT:  HEALTH ISSUES - PROBLEM Dx:      ,   Patient is a 76y old  Female who presents with a chief complaint of elective surgery: Right VATS, robotic assisted, Right lower lobectomy, with MLND (02 Mar 2022 11:39)     s/p cardiac surgery                My plan includes :  close hemodynamic, ventilatory and drain monitoring and management per post op routine    Monitor for arrhythmias and monitor parameters for organ perfusion  beta blockade not administered due to hemodynamic instability and bradycardia  monitor neurologic status  Head of the bed should remain elevated to 45 deg .   chest PT and IS will be encouraged  monitor adequacy of oxygenation and ventilation and attempt to wean oxygen  antibiotic regimen will be tailored to the clinical, laboratory and microbiologic data  Nutritional goals will be met using po eventually , ensure adequate caloric intake and montior the same  Stress ulcer and VTE prophylaxis will be achieved    Glycemic control is satisfactory  Electrolytes have been repleted as necessary and wound care has been carried out. Pain control has been achieved.   agressive physical therapy and early mobility and ambulation goals will be met   The family was updated about the course and plan  CRITICAL CARE TIME personally provided by me  in evaluation and management, reassessments, review and interpretation of labs and x-rays, ventilator and hemodynamic management, formulating a plan and coordinating care: ___90____ MIN.  Time does not include procedural time. Time spent was non routine post-operarive caRE and included multiple and repeated evaluations at the bedside  CTICU ATTENDING     					    Joseph Wyatt MD

## 2022-03-03 LAB
ANION GAP SERPL CALC-SCNC: 15 MMOL/L — SIGNIFICANT CHANGE UP (ref 5–17)
APTT BLD: 30.8 SEC — SIGNIFICANT CHANGE UP (ref 27.5–35.5)
BUN SERPL-MCNC: 21 MG/DL — SIGNIFICANT CHANGE UP (ref 7–23)
CALCIUM SERPL-MCNC: 9.2 MG/DL — SIGNIFICANT CHANGE UP (ref 8.4–10.5)
CHLORIDE SERPL-SCNC: 100 MMOL/L — SIGNIFICANT CHANGE UP (ref 96–108)
CO2 SERPL-SCNC: 23 MMOL/L — SIGNIFICANT CHANGE UP (ref 22–31)
CREAT SERPL-MCNC: 0.84 MG/DL — SIGNIFICANT CHANGE UP (ref 0.5–1.3)
EGFR: 72 ML/MIN/1.73M2 — SIGNIFICANT CHANGE UP
GLUCOSE BLDC GLUCOMTR-MCNC: 90 MG/DL — SIGNIFICANT CHANGE UP (ref 70–99)
GLUCOSE SERPL-MCNC: 98 MG/DL — SIGNIFICANT CHANGE UP (ref 70–99)
HCT VFR BLD CALC: 44.5 % — SIGNIFICANT CHANGE UP (ref 34.5–45)
HGB BLD-MCNC: 14 G/DL — SIGNIFICANT CHANGE UP (ref 11.5–15.5)
INR BLD: 0.97 — SIGNIFICANT CHANGE UP (ref 0.88–1.16)
MAGNESIUM SERPL-MCNC: 2.4 MG/DL — SIGNIFICANT CHANGE UP (ref 1.6–2.6)
MCHC RBC-ENTMCNC: 27.5 PG — SIGNIFICANT CHANGE UP (ref 27–34)
MCHC RBC-ENTMCNC: 31.5 GM/DL — LOW (ref 32–36)
MCV RBC AUTO: 87.3 FL — SIGNIFICANT CHANGE UP (ref 80–100)
NRBC # BLD: 0 /100 WBCS — SIGNIFICANT CHANGE UP (ref 0–0)
PLATELET # BLD AUTO: 184 K/UL — SIGNIFICANT CHANGE UP (ref 150–400)
POTASSIUM SERPL-MCNC: 4 MMOL/L — SIGNIFICANT CHANGE UP (ref 3.5–5.3)
POTASSIUM SERPL-SCNC: 4 MMOL/L — SIGNIFICANT CHANGE UP (ref 3.5–5.3)
PROTHROM AB SERPL-ACNC: 11.5 SEC — SIGNIFICANT CHANGE UP (ref 10.5–13.4)
RBC # BLD: 5.1 M/UL — SIGNIFICANT CHANGE UP (ref 3.8–5.2)
RBC # FLD: 13.8 % — SIGNIFICANT CHANGE UP (ref 10.3–14.5)
SODIUM SERPL-SCNC: 138 MMOL/L — SIGNIFICANT CHANGE UP (ref 135–145)
TSH SERPL-MCNC: 2.26 UIU/ML — SIGNIFICANT CHANGE UP (ref 0.27–4.2)
WBC # BLD: 11.3 K/UL — HIGH (ref 3.8–10.5)
WBC # FLD AUTO: 11.3 K/UL — HIGH (ref 3.8–10.5)

## 2022-03-03 PROCEDURE — 71045 X-RAY EXAM CHEST 1 VIEW: CPT | Mod: 26

## 2022-03-03 RX ORDER — PANTOPRAZOLE SODIUM 20 MG/1
40 TABLET, DELAYED RELEASE ORAL
Refills: 0 | Status: DISCONTINUED | OUTPATIENT
Start: 2022-03-03 | End: 2022-03-07

## 2022-03-03 RX ORDER — POLYETHYLENE GLYCOL 3350 17 G/17G
17 POWDER, FOR SOLUTION ORAL DAILY
Refills: 0 | Status: DISCONTINUED | OUTPATIENT
Start: 2022-03-03 | End: 2022-03-07

## 2022-03-03 RX ORDER — LOSARTAN POTASSIUM 100 MG/1
50 TABLET, FILM COATED ORAL DAILY
Refills: 0 | Status: DISCONTINUED | OUTPATIENT
Start: 2022-03-03 | End: 2022-03-07

## 2022-03-03 RX ORDER — ACETAMINOPHEN 500 MG
650 TABLET ORAL EVERY 6 HOURS
Refills: 0 | Status: DISCONTINUED | OUTPATIENT
Start: 2022-03-03 | End: 2022-03-07

## 2022-03-03 RX ORDER — HYDROCHLOROTHIAZIDE 25 MG
12.5 TABLET ORAL DAILY
Refills: 0 | Status: DISCONTINUED | OUTPATIENT
Start: 2022-03-03 | End: 2022-03-07

## 2022-03-03 RX ADMIN — FAMOTIDINE 20 MILLIGRAM(S): 10 INJECTION INTRAVENOUS at 05:45

## 2022-03-03 RX ADMIN — LIDOCAINE 1 PATCH: 4 CREAM TOPICAL at 23:30

## 2022-03-03 RX ADMIN — RALOXIFENE HYDROCHLORIDE 60 MILLIGRAM(S): 60 TABLET, COATED ORAL at 12:01

## 2022-03-03 RX ADMIN — POLYETHYLENE GLYCOL 3350 17 GRAM(S): 17 POWDER, FOR SOLUTION ORAL at 12:01

## 2022-03-03 RX ADMIN — SODIUM CHLORIDE 3 MILLILITER(S): 9 INJECTION INTRAMUSCULAR; INTRAVENOUS; SUBCUTANEOUS at 06:11

## 2022-03-03 RX ADMIN — HEPARIN SODIUM 5000 UNIT(S): 5000 INJECTION INTRAVENOUS; SUBCUTANEOUS at 05:45

## 2022-03-03 RX ADMIN — LEVETIRACETAM 500 MILLIGRAM(S): 250 TABLET, FILM COATED ORAL at 05:45

## 2022-03-03 RX ADMIN — LEVETIRACETAM 500 MILLIGRAM(S): 250 TABLET, FILM COATED ORAL at 17:32

## 2022-03-03 RX ADMIN — HEPARIN SODIUM 5000 UNIT(S): 5000 INJECTION INTRAVENOUS; SUBCUTANEOUS at 15:34

## 2022-03-03 RX ADMIN — HEPARIN SODIUM 5000 UNIT(S): 5000 INJECTION INTRAVENOUS; SUBCUTANEOUS at 22:11

## 2022-03-03 RX ADMIN — LIDOCAINE 1 PATCH: 4 CREAM TOPICAL at 12:01

## 2022-03-03 RX ADMIN — QUETIAPINE FUMARATE 100 MILLIGRAM(S): 200 TABLET, FILM COATED ORAL at 22:11

## 2022-03-03 RX ADMIN — DONEPEZIL HYDROCHLORIDE 5 MILLIGRAM(S): 10 TABLET, FILM COATED ORAL at 22:11

## 2022-03-03 RX ADMIN — LOSARTAN POTASSIUM 50 MILLIGRAM(S): 100 TABLET, FILM COATED ORAL at 08:07

## 2022-03-03 RX ADMIN — SENNA PLUS 2 TABLET(S): 8.6 TABLET ORAL at 22:11

## 2022-03-03 RX ADMIN — Medication 650 MILLIGRAM(S): at 15:06

## 2022-03-03 RX ADMIN — ATORVASTATIN CALCIUM 10 MILLIGRAM(S): 80 TABLET, FILM COATED ORAL at 22:11

## 2022-03-03 RX ADMIN — Medication 12.5 MILLIGRAM(S): at 19:39

## 2022-03-03 RX ADMIN — LIDOCAINE 1 PATCH: 4 CREAM TOPICAL at 00:30

## 2022-03-03 RX ADMIN — SODIUM CHLORIDE 3 MILLILITER(S): 9 INJECTION INTRAMUSCULAR; INTRAVENOUS; SUBCUTANEOUS at 22:36

## 2022-03-03 NOTE — PROGRESS NOTE ADULT - SUBJECTIVE AND OBJECTIVE BOX
Patient discussed on morning rounds with       Operation / Date:     SUBJECTIVE ASSESSMENT:  76y Female         Vital Signs Last 24 Hrs  T(C): 36.5 (03 Mar 2022 04:57), Max: 37.1 (02 Mar 2022 10:00)  T(F): 97.7 (03 Mar 2022 04:57), Max: 98.7 (02 Mar 2022 10:00)  HR: 94 (03 Mar 2022 08:09) (74 - 96)  BP: 176/79 (03 Mar 2022 08:09) (123/60 - 176/79)  BP(mean): 114 (03 Mar 2022 08:09) (84 - 118)  RR: 20 (03 Mar 2022 08:09) (16 - 20)  SpO2: 92% (03 Mar 2022 08:09) (91% - 97%)  I&O's Detail    02 Mar 2022 07:01  -  03 Mar 2022 07:00  --------------------------------------------------------  IN:    Lactated Ringers: 75 mL    Oral Fluid: 120 mL  Total IN: 195 mL    OUT:    Chest Tube (mL): 205 mL    Chest Tube (mL): 110 mL    Indwelling Catheter - Urethral (mL): 305 mL    Voided (mL): 102 mL  Total OUT: 722 mL    Total NET: -527 mL      03 Mar 2022 07:01  -  03 Mar 2022 09:39  --------------------------------------------------------  IN:  Total IN: 0 mL    OUT:    Chest Tube (mL): 0 mL    Chest Tube (mL): 0 mL  Total OUT: 0 mL    Total NET: 0 mL          CHEST TUBE:  Yes/No. AIR LEAKS: Yes/No. Suction / H2O SEAL.   GABBY DRAIN:  Yes/No.  EPICARDIAL WIRES: Yes/No.  TIE DOWNS: Yes/No.  ROD: Yes/No.    PHYSICAL EXAM:    General:     Neurological:    Cardiovascular:    Respiratory:    Gastrointestinal:    Extremities:    Vascular:    Incision Sites:    LABS:                        14.0   11.30 )-----------( 184      ( 03 Mar 2022 06:43 )             44.5       COUMADIN:  Yes/No. REASON: .    PT/INR - ( 03 Mar 2022 06:43 )   PT: 11.5 sec;   INR: 0.97          PTT - ( 03 Mar 2022 06:43 )  PTT:30.8 sec    03-03    138  |  100  |  21  ----------------------------<  98  4.0   |  23  |  0.84    Ca    9.2      03 Mar 2022 06:43  Mg     2.4     03-03            MEDICATIONS  (STANDING):  atorvastatin 10 milliGRAM(s) Oral at bedtime  dextrose 40% Gel 15 Gram(s) Oral once  dextrose 5%. 1000 milliLiter(s) (50 mL/Hr) IV Continuous <Continuous>  dextrose 5%. 1000 milliLiter(s) (100 mL/Hr) IV Continuous <Continuous>  dextrose 50% Injectable 25 Gram(s) IV Push once  dextrose 50% Injectable 12.5 Gram(s) IV Push once  dextrose 50% Injectable 25 Gram(s) IV Push once  donepezil 5 milliGRAM(s) Oral at bedtime  famotidine    Tablet 20 milliGRAM(s) Oral every 12 hours  glucagon  Injectable 1 milliGRAM(s) IntraMuscular once  heparin   Injectable 5000 Unit(s) SubCutaneous every 8 hours  insulin lispro (ADMELOG) corrective regimen sliding scale   SubCutaneous Before meals and at bedtime  lactated ringers. 1000 milliLiter(s) (75 mL/Hr) IV Continuous <Continuous>  levETIRAcetam 500 milliGRAM(s) Oral two times a day  lidocaine   4% Patch 1 Patch Transdermal daily  losartan 50 milliGRAM(s) Oral daily  QUEtiapine 100 milliGRAM(s) Oral at bedtime  raloxifene 60 milliGRAM(s) Oral daily  senna 2 Tablet(s) Oral at bedtime  sodium chloride 0.9% lock flush 3 milliLiter(s) IV Push every 8 hours    MEDICATIONS  (PRN):  morphine  - Injectable 2 milliGRAM(s) IV Push every 6 hours PRN Severe Pain (7 - 10)        RADIOLOGY & ADDITIONAL TESTS:     Patient discussed on morning rounds with Dr. Bermudez     Operation / Date: 3/1/2022 R VATS RA RLLx     SUBJECTIVE ASSESSMENT:  Patient seen this morning at bedside, denies any complaints.     Vital Signs Last 24 Hrs  T(C): 36.5 (03 Mar 2022 04:57), Max: 37.1 (02 Mar 2022 10:00)  T(F): 97.7 (03 Mar 2022 04:57), Max: 98.7 (02 Mar 2022 10:00)  HR: 94 (03 Mar 2022 08:09) (74 - 96)  BP: 176/79 (03 Mar 2022 08:09) (123/60 - 176/79)  BP(mean): 114 (03 Mar 2022 08:09) (84 - 118)  RR: 20 (03 Mar 2022 08:09) (16 - 20)  SpO2: 92% (03 Mar 2022 08:09) (91% - 97%)  I&O's Detail    02 Mar 2022 07:01  -  03 Mar 2022 07:00  --------------------------------------------------------  IN:    Lactated Ringers: 75 mL    Oral Fluid: 120 mL  Total IN: 195 mL    OUT:    Chest Tube (mL): 205 mL    Chest Tube (mL): 110 mL    Indwelling Catheter - Urethral (mL): 305 mL    Voided (mL): 102 mL  Total OUT: 722 mL    Total NET: -527 mL      03 Mar 2022 07:01  -  03 Mar 2022 09:39  --------------------------------------------------------  IN:  Total IN: 0 mL    OUT:    Chest Tube (mL): 0 mL    Chest Tube (mL): 0 mL  Total OUT: 0 mL    Total NET: 0 mL    CHEST TUBE:  Yes x 2 on suction. + 1/5 air leak   GABBY DRAIN:  No  EPICARDIAL WIRES: No  TIE DOWNS: Yes.  ROD: No    PHYSICAL EXAM:    General: Patient sitting comfortably in a chair, no acute distress     Neurological: Alert. No focal neurological deficits.     Cardiovascular: S1S2, RRR, no murmurs appreciated on exam     Respiratory: Clear to ausculation bilaterally     Gastrointestinal: Abdomen soft, non tender, non distended     Extremities: Warm and well perfused. No edema or calf tenderness     Vascular: Peripheral pulses 2+ bilaterally     Incision Sites: R VATS incisions C/D/I   Chest tube x 2 secured in place     LABS:                        14.0   11.30 )-----------( 184      ( 03 Mar 2022 06:43 )             44.5       COUMADIN: No    PT/INR - ( 03 Mar 2022 06:43 )   PT: 11.5 sec;   INR: 0.97          PTT - ( 03 Mar 2022 06:43 )  PTT:30.8 sec    03-03    138  |  100  |  21  ----------------------------<  98  4.0   |  23  |  0.84    Ca    9.2      03 Mar 2022 06:43  Mg     2.4     03-03      MEDICATIONS  (STANDING):  atorvastatin 10 milliGRAM(s) Oral at bedtime  donepezil 5 milliGRAM(s) Oral at bedtime  famotidine    Tablet 20 milliGRAM(s) Oral every 12 hours  heparin   Injectable 5000 Unit(s) SubCutaneous every 8 hours  lactated ringers. 1000 milliLiter(s) (75 mL/Hr) IV Continuous <Continuous>  levETIRAcetam 500 milliGRAM(s) Oral two times a day  lidocaine   4% Patch 1 Patch Transdermal daily  losartan 50 milliGRAM(s) Oral daily  QUEtiapine 100 milliGRAM(s) Oral at bedtime  raloxifene 60 milliGRAM(s) Oral daily  senna 2 Tablet(s) Oral at bedtime  sodium chloride 0.9% lock flush 3 milliLiter(s) IV Push every 8 hours    MEDICATIONS  (PRN):  morphine  - Injectable 2 milliGRAM(s) IV Push every 6 hours PRN Severe Pain (7 - 10)    RADIOLOGY & ADDITIONAL TESTS:

## 2022-03-04 PROBLEM — E11.9 TYPE 2 DIABETES MELLITUS WITHOUT COMPLICATIONS: Chronic | Status: ACTIVE | Noted: 2022-02-24

## 2022-03-04 PROBLEM — M81.0 AGE-RELATED OSTEOPOROSIS WITHOUT CURRENT PATHOLOGICAL FRACTURE: Chronic | Status: ACTIVE | Noted: 2022-02-24

## 2022-03-04 PROBLEM — F20.9 SCHIZOPHRENIA, UNSPECIFIED: Chronic | Status: ACTIVE | Noted: 2022-02-24

## 2022-03-04 PROBLEM — I10 ESSENTIAL (PRIMARY) HYPERTENSION: Chronic | Status: ACTIVE | Noted: 2022-02-24

## 2022-03-04 PROBLEM — E78.5 HYPERLIPIDEMIA, UNSPECIFIED: Chronic | Status: ACTIVE | Noted: 2022-02-24

## 2022-03-04 PROCEDURE — 71045 X-RAY EXAM CHEST 1 VIEW: CPT | Mod: 26

## 2022-03-04 RX ORDER — TRAMADOL HYDROCHLORIDE 50 MG/1
25 TABLET ORAL EVERY 6 HOURS
Refills: 0 | Status: DISCONTINUED | OUTPATIENT
Start: 2022-03-04 | End: 2022-03-07

## 2022-03-04 RX ADMIN — SODIUM CHLORIDE 3 MILLILITER(S): 9 INJECTION INTRAMUSCULAR; INTRAVENOUS; SUBCUTANEOUS at 14:28

## 2022-03-04 RX ADMIN — SODIUM CHLORIDE 3 MILLILITER(S): 9 INJECTION INTRAMUSCULAR; INTRAVENOUS; SUBCUTANEOUS at 05:23

## 2022-03-04 RX ADMIN — LIDOCAINE 1 PATCH: 4 CREAM TOPICAL at 20:00

## 2022-03-04 RX ADMIN — HEPARIN SODIUM 5000 UNIT(S): 5000 INJECTION INTRAVENOUS; SUBCUTANEOUS at 13:10

## 2022-03-04 RX ADMIN — Medication 650 MILLIGRAM(S): at 13:56

## 2022-03-04 RX ADMIN — LEVETIRACETAM 500 MILLIGRAM(S): 250 TABLET, FILM COATED ORAL at 05:10

## 2022-03-04 RX ADMIN — ATORVASTATIN CALCIUM 10 MILLIGRAM(S): 80 TABLET, FILM COATED ORAL at 21:37

## 2022-03-04 RX ADMIN — RALOXIFENE HYDROCHLORIDE 60 MILLIGRAM(S): 60 TABLET, COATED ORAL at 13:10

## 2022-03-04 RX ADMIN — QUETIAPINE FUMARATE 100 MILLIGRAM(S): 200 TABLET, FILM COATED ORAL at 21:37

## 2022-03-04 RX ADMIN — SODIUM CHLORIDE 3 MILLILITER(S): 9 INJECTION INTRAMUSCULAR; INTRAVENOUS; SUBCUTANEOUS at 21:36

## 2022-03-04 RX ADMIN — SENNA PLUS 2 TABLET(S): 8.6 TABLET ORAL at 21:37

## 2022-03-04 RX ADMIN — HEPARIN SODIUM 5000 UNIT(S): 5000 INJECTION INTRAVENOUS; SUBCUTANEOUS at 21:37

## 2022-03-04 RX ADMIN — LIDOCAINE 1 PATCH: 4 CREAM TOPICAL at 13:10

## 2022-03-04 RX ADMIN — DONEPEZIL HYDROCHLORIDE 5 MILLIGRAM(S): 10 TABLET, FILM COATED ORAL at 21:37

## 2022-03-04 RX ADMIN — LOSARTAN POTASSIUM 50 MILLIGRAM(S): 100 TABLET, FILM COATED ORAL at 05:08

## 2022-03-04 RX ADMIN — Medication 12.5 MILLIGRAM(S): at 05:09

## 2022-03-04 RX ADMIN — HEPARIN SODIUM 5000 UNIT(S): 5000 INJECTION INTRAVENOUS; SUBCUTANEOUS at 05:08

## 2022-03-04 RX ADMIN — LEVETIRACETAM 500 MILLIGRAM(S): 250 TABLET, FILM COATED ORAL at 18:28

## 2022-03-04 RX ADMIN — PANTOPRAZOLE SODIUM 40 MILLIGRAM(S): 20 TABLET, DELAYED RELEASE ORAL at 06:32

## 2022-03-04 NOTE — DIETITIAN INITIAL EVALUATION ADULT. - ADD RECOMMEND
1. Continue with current diet order (monitor need for ONS) 2. Encourage pt to meet nutritional needs as able 3. Monitor electrolytes; replete PRN 4. Pain and bowel regimen per team 5. Will continue to assess/honor preferences as able

## 2022-03-04 NOTE — DIETITIAN INITIAL EVALUATION ADULT. - OTHER INFO
76 year old female, with PMHx of DM, HTN, HLD, Osteoporosis, Dementia, Schizophrenia, with RLL spiculated nodule found on CT chest s/p EBUS with bx + Adenocarcinoma. Patient was evaluated by Dr. Bermudez and presented to [facility] on 3/1/2022 for planned surgery. Patient underwent R VATS RA Right Lower Lobectomy with MLND. Procedure was uncomplicated, at end of procedure patient was noted to be lethargic and not ready for extubation. She was transferred to CT ICU intubated and stable. She was later extubated on POD#0. On POD#1 patient required robles replaced for urinary retention. She otherwise remained stable and transferred to  on POD#1. Robles was incidentally removed on POD#1 by patient and she later passed TOV.    Pt seen at bedside for initial assessment- on NC. Spoke to son via visitors cell phone to obtain additional information, however, pt w/ limited engagement in RD interview thus limited information obtained. No nausea/vomiting documented. No bowel movement since admission. Confirms NKFA. Denies change in appetite PTA. Reports usual body weight 124 pounds (relatively consistent with dosing weight 126 pounds). Verbalizes no recent weight loss. No edema documented at this time. No pressure ulcers documented at this time. Right lateral chest incision per chart. Abdirahman score=16. Labs reviewed 3/4; electrolytes within normal limits at this time. Observed pt with no overt signs of muscle or fat wasting. Based on ASPEN guidelines, pt does not meet criteria for malnutrition at this time. Unable to provide diet education at this time due to limited conversation w/ son. Per son, pt feeling hungry during hospital stay, able to complete ~50% of meals. No cultural, ethnic, Yarsani food preferences noted. Made aware RD remains available. RD to follow up per protocol. See nutrition recommendations below.

## 2022-03-04 NOTE — DIETITIAN INITIAL EVALUATION ADULT. - OTHER CALCULATIONS
Based on Standards of Care pt within % IBW thus actual body weight used for all calculations. Needs adjusted for advanced age, post op and CA.

## 2022-03-04 NOTE — PROGRESS NOTE ADULT - SUBJECTIVE AND OBJECTIVE BOX
Patient discussed on morning rounds with Dr. Hunter    Operation / Date: 3/1/2022 R VATS RA RLLx     SUBJECTIVE ASSESSMENT:  Translated via son (Facetime this am) and then with daughter-in-law (bedside in the afternoon).  Patient c/o pain in her side, likely related to the chest tubes.        Vital Signs Last 24 Hrs  T(C): 36 (04 Mar 2022 09:39), Max: 36.9 (03 Mar 2022 18:19)  T(F): 96.8 (04 Mar 2022 09:39), Max: 98.5 (03 Mar 2022 18:19)  HR: 86 (04 Mar 2022 12:50) (72 - 92)  BP: 125/63 (04 Mar 2022 12:50) (125/63 - 175/83)  BP(mean): 87 (04 Mar 2022 12:50) (82 - 119)  RR: 18 (04 Mar 2022 12:50) (16 - 18)  SpO2: 94% (04 Mar 2022 12:50) (93% - 99%)  I&O's Detail    03 Mar 2022 07:01  -  04 Mar 2022 07:00  --------------------------------------------------------  IN:  Total IN: 0 mL    OUT:    Chest Tube (mL): 100 mL    Chest Tube (mL): 26 mL  Total OUT: 126 mL    Total NET: -126 mL          CHEST TUBE:  Yes/No. AIR LEAKS: Yes/No. Suction / H2O SEAL.   GABBY DRAIN:  Yes/No.  EPICARDIAL WIRES: Yes/No.  TIE DOWNS: Yes/No.  ROD: Yes/No.    PHYSICAL EXAM:    General:     Neurological:    Cardiovascular:    Respiratory:    Gastrointestinal:    Extremities:    Vascular:    Incision Sites:    LABS:                        14.0   11.30 )-----------( 184      ( 03 Mar 2022 06:43 )             44.5       COUMADIN:  Yes/No. REASON: .    PT/INR - ( 03 Mar 2022 06:43 )   PT: 11.5 sec;   INR: 0.97          PTT - ( 03 Mar 2022 06:43 )  PTT:30.8 sec    03-03    138  |  100  |  21  ----------------------------<  98  4.0   |  23  |  0.84    Ca    9.2      03 Mar 2022 06:43  Mg     2.4     03-03            MEDICATIONS  (STANDING):  atorvastatin 10 milliGRAM(s) Oral at bedtime  donepezil 5 milliGRAM(s) Oral at bedtime  heparin   Injectable 5000 Unit(s) SubCutaneous every 8 hours  hydrochlorothiazide 12.5 milliGRAM(s) Oral daily  levETIRAcetam 500 milliGRAM(s) Oral two times a day  lidocaine   4% Patch 1 Patch Transdermal daily  losartan 50 milliGRAM(s) Oral daily  pantoprazole    Tablet 40 milliGRAM(s) Oral before breakfast  polyethylene glycol 3350 17 Gram(s) Oral daily  QUEtiapine 100 milliGRAM(s) Oral at bedtime  raloxifene 60 milliGRAM(s) Oral daily  senna 2 Tablet(s) Oral at bedtime  sodium chloride 0.9% lock flush 3 milliLiter(s) IV Push every 8 hours    MEDICATIONS  (PRN):  acetaminophen     Tablet .. 650 milliGRAM(s) Oral every 6 hours PRN Mild Pain (1 - 3)  traMADol 25 milliGRAM(s) Oral every 6 hours PRN Severe Pain (7 - 10)        RADIOLOGY & ADDITIONAL TESTS:     Patient discussed on morning rounds with Dr. Hunter    Operation / Date: 3/1/2022 R ARGELIA RA RLLx     SUBJECTIVE ASSESSMENT:  Translated via son (Facetime this am) and then with daughter-in-law (bedside in the afternoon).  Patient c/o pain in her side, likely related to the chest tubes.  Even with family present, it was very difficult to assess for CT air leaks.  They are on waterseal currently.  The patient will not cough or clear her throat.  Stating that "she doesn't want to".  Denies CP/SOB/N/V/D/dizziness/cough/fever/chills.        Vital Signs Last 24 Hrs  T(C): 36 (04 Mar 2022 09:39), Max: 36.9 (03 Mar 2022 18:19)  T(F): 96.8 (04 Mar 2022 09:39), Max: 98.5 (03 Mar 2022 18:19)  HR: 86 (04 Mar 2022 12:50) (72 - 92)  BP: 125/63 (04 Mar 2022 12:50) (125/63 - 175/83)  BP(mean): 87 (04 Mar 2022 12:50) (82 - 119)  RR: 18 (04 Mar 2022 12:50) (16 - 18)  SpO2: 94% (04 Mar 2022 12:50) (93% - 99%)  I&O's Detail    03 Mar 2022 07:01  -  04 Mar 2022 07:00  --------------------------------------------------------  IN:  Total IN: 0 mL    OUT:    Chest Tube (mL): 100 mL    Chest Tube (mL): 26 mL  Total OUT: 126 mL    Total NET: -126 mL          CHEST TUBE:  Yes x 2.  Some tidaling with both tubes and some "turbulence" seen in the waterseal chamber when she talks. Unclear if there is a definite air leak or not      PHYSICAL EXAM:    General:     Neurological:    Cardiovascular:    Respiratory:    Gastrointestinal:    Extremities:    Vascular:    Incision Sites:    LABS:                        14.0   11.30 )-----------( 184      ( 03 Mar 2022 06:43 )             44.5       COUMADIN:  Yes/No. REASON: .    PT/INR - ( 03 Mar 2022 06:43 )   PT: 11.5 sec;   INR: 0.97          PTT - ( 03 Mar 2022 06:43 )  PTT:30.8 sec    03-03    138  |  100  |  21  ----------------------------<  98  4.0   |  23  |  0.84    Ca    9.2      03 Mar 2022 06:43  Mg     2.4     03-03            MEDICATIONS  (STANDING):  atorvastatin 10 milliGRAM(s) Oral at bedtime  donepezil 5 milliGRAM(s) Oral at bedtime  heparin   Injectable 5000 Unit(s) SubCutaneous every 8 hours  hydrochlorothiazide 12.5 milliGRAM(s) Oral daily  levETIRAcetam 500 milliGRAM(s) Oral two times a day  lidocaine   4% Patch 1 Patch Transdermal daily  losartan 50 milliGRAM(s) Oral daily  pantoprazole    Tablet 40 milliGRAM(s) Oral before breakfast  polyethylene glycol 3350 17 Gram(s) Oral daily  QUEtiapine 100 milliGRAM(s) Oral at bedtime  raloxifene 60 milliGRAM(s) Oral daily  senna 2 Tablet(s) Oral at bedtime  sodium chloride 0.9% lock flush 3 milliLiter(s) IV Push every 8 hours    MEDICATIONS  (PRN):  acetaminophen     Tablet .. 650 milliGRAM(s) Oral every 6 hours PRN Mild Pain (1 - 3)  traMADol 25 milliGRAM(s) Oral every 6 hours PRN Severe Pain (7 - 10)        RADIOLOGY & ADDITIONAL TESTS:     Patient discussed on morning rounds with Dr. Hunter    Operation / Date: 3/1/2022 R ARGELIA RA RLLx     SUBJECTIVE ASSESSMENT:  Translated via son (Facetime this am) and then with daughter-in-law (bedside in the afternoon).  Patient c/o pain in her side, likely related to the chest tubes.  Even with family present, it was very difficult to assess for CT air leaks.  They are on waterseal currently.  The patient will not cough or clear her throat.  Stating that "she doesn't want to".  Denies CP/SOB/N/V/D/dizziness/cough/fever/chills.        Vital Signs Last 24 Hrs  T(C): 36 (04 Mar 2022 09:39), Max: 36.9 (03 Mar 2022 18:19)  T(F): 96.8 (04 Mar 2022 09:39), Max: 98.5 (03 Mar 2022 18:19)  HR: 86 (04 Mar 2022 12:50) (72 - 92)  BP: 125/63 (04 Mar 2022 12:50) (125/63 - 175/83)  BP(mean): 87 (04 Mar 2022 12:50) (82 - 119)  RR: 18 (04 Mar 2022 12:50) (16 - 18)  SpO2: 94% (04 Mar 2022 12:50) (93% - 99%)  I&O's Detail    03 Mar 2022 07:01  -  04 Mar 2022 07:00  --------------------------------------------------------  IN:  Total IN: 0 mL    OUT:    Chest Tube (mL): 100 mL    Chest Tube (mL): 26 mL  Total OUT: 126 mL    Total NET: -126 mL          CHEST TUBE:  Yes x 2.  Some tidaling with both tubes and some "turbulence" seen in the waterseal chamber when she talks. Unclear if there is a definite air leak or not      PHYSICAL EXAM:    GEN: NAD, looks comfortable  Psych: Mood appropriate  Neuro: A&Ox3.  No focal deficits.  Moving all extremities.   HEENT: No obvious abnormalities  CV: S1S2, regular, no murmurs appreciated.  No carotid bruits.  No JVD  Lungs: Clear B/L.  No wheezing, rales or rhonchi  ABD: Soft, non-tender, non-distended.  +Bowel sounds  EXT: Warm and well perfused.  No peripheral edema noted  Musculoskeletal: Moving all extremities with normal ROM, no joint swelling  PV: Pedal pulses palpable  Incision Sites: Stable, open to air.     LABS:                        14.0   11.30 )-----------( 184      ( 03 Mar 2022 06:43 )             44.5         PT/INR - ( 03 Mar 2022 06:43 )   PT: 11.5 sec;   INR: 0.97          PTT - ( 03 Mar 2022 06:43 )  PTT:30.8 sec    03-03    138  |  100  |  21  ----------------------------<  98  4.0   |  23  |  0.84    Ca    9.2      03 Mar 2022 06:43  Mg     2.4     03-03            MEDICATIONS  (STANDING):  atorvastatin 10 milliGRAM(s) Oral at bedtime  donepezil 5 milliGRAM(s) Oral at bedtime  heparin   Injectable 5000 Unit(s) SubCutaneous every 8 hours  hydrochlorothiazide 12.5 milliGRAM(s) Oral daily  levETIRAcetam 500 milliGRAM(s) Oral two times a day  lidocaine   4% Patch 1 Patch Transdermal daily  losartan 50 milliGRAM(s) Oral daily  pantoprazole    Tablet 40 milliGRAM(s) Oral before breakfast  polyethylene glycol 3350 17 Gram(s) Oral daily  QUEtiapine 100 milliGRAM(s) Oral at bedtime  raloxifene 60 milliGRAM(s) Oral daily  senna 2 Tablet(s) Oral at bedtime  sodium chloride 0.9% lock flush 3 milliLiter(s) IV Push every 8 hours    MEDICATIONS  (PRN):  acetaminophen     Tablet .. 650 milliGRAM(s) Oral every 6 hours PRN Mild Pain (1 - 3)  traMADol 25 milliGRAM(s) Oral every 6 hours PRN Severe Pain (7 - 10)        RADIOLOGY & ADDITIONAL TESTS:

## 2022-03-04 NOTE — DIETITIAN INITIAL EVALUATION ADULT. - PERTINENT LABORATORY DATA
Sodium, Serum: 138 mmol/L  Potassium, Serum: 4.0 mmol/L  Chloride, Serum: 100 mmol/L  BUN, Serum: 21 mg/dL  Creatinine, Serum: 0.84 mg/dL  Glucose, Serum: 98 mg/dL  Calcium, Serum: 9.2 mg/dL  Magnesium, Serum: 2.4 mg/dL    Last HbA1c 6.8% (3/2); noted h/o diabetes      Lipid Profile (2/18)  Cholesterol, Serum: 166 mg/dL  Triglycerides, Serum: 220 mg/dL (Elevated)  HDL Cholesterol, Serum: 51 mg/dL   LDL Cholesterol Calculated: 71 mg/dL

## 2022-03-05 LAB
ANION GAP SERPL CALC-SCNC: 12 MMOL/L — SIGNIFICANT CHANGE UP (ref 5–17)
BUN SERPL-MCNC: 24 MG/DL — HIGH (ref 7–23)
CALCIUM SERPL-MCNC: 8.7 MG/DL — SIGNIFICANT CHANGE UP (ref 8.4–10.5)
CHLORIDE SERPL-SCNC: 102 MMOL/L — SIGNIFICANT CHANGE UP (ref 96–108)
CO2 SERPL-SCNC: 26 MMOL/L — SIGNIFICANT CHANGE UP (ref 22–31)
CREAT SERPL-MCNC: 0.76 MG/DL — SIGNIFICANT CHANGE UP (ref 0.5–1.3)
EGFR: 81 ML/MIN/1.73M2 — SIGNIFICANT CHANGE UP
GLUCOSE BLDC GLUCOMTR-MCNC: 156 MG/DL — HIGH (ref 70–99)
GLUCOSE SERPL-MCNC: 120 MG/DL — HIGH (ref 70–99)
HCT VFR BLD CALC: 41.3 % — SIGNIFICANT CHANGE UP (ref 34.5–45)
HGB BLD-MCNC: 13.2 G/DL — SIGNIFICANT CHANGE UP (ref 11.5–15.5)
MAGNESIUM SERPL-MCNC: 2.3 MG/DL — SIGNIFICANT CHANGE UP (ref 1.6–2.6)
MCHC RBC-ENTMCNC: 27.8 PG — SIGNIFICANT CHANGE UP (ref 27–34)
MCHC RBC-ENTMCNC: 32 GM/DL — SIGNIFICANT CHANGE UP (ref 32–36)
MCV RBC AUTO: 87.1 FL — SIGNIFICANT CHANGE UP (ref 80–100)
NRBC # BLD: 0 /100 WBCS — SIGNIFICANT CHANGE UP (ref 0–0)
PLATELET # BLD AUTO: 194 K/UL — SIGNIFICANT CHANGE UP (ref 150–400)
POTASSIUM SERPL-MCNC: 3.6 MMOL/L — SIGNIFICANT CHANGE UP (ref 3.5–5.3)
POTASSIUM SERPL-SCNC: 3.6 MMOL/L — SIGNIFICANT CHANGE UP (ref 3.5–5.3)
RBC # BLD: 4.74 M/UL — SIGNIFICANT CHANGE UP (ref 3.8–5.2)
RBC # FLD: 13.9 % — SIGNIFICANT CHANGE UP (ref 10.3–14.5)
SODIUM SERPL-SCNC: 140 MMOL/L — SIGNIFICANT CHANGE UP (ref 135–145)
WBC # BLD: 6.78 K/UL — SIGNIFICANT CHANGE UP (ref 3.8–10.5)
WBC # FLD AUTO: 6.78 K/UL — SIGNIFICANT CHANGE UP (ref 3.8–10.5)

## 2022-03-05 PROCEDURE — 71045 X-RAY EXAM CHEST 1 VIEW: CPT | Mod: 26

## 2022-03-05 RX ORDER — INSULIN LISPRO 100/ML
VIAL (ML) SUBCUTANEOUS
Refills: 0 | Status: DISCONTINUED | OUTPATIENT
Start: 2022-03-05 | End: 2022-03-07

## 2022-03-05 RX ORDER — DEXTROSE 50 % IN WATER 50 %
15 SYRINGE (ML) INTRAVENOUS ONCE
Refills: 0 | Status: DISCONTINUED | OUTPATIENT
Start: 2022-03-05 | End: 2022-03-07

## 2022-03-05 RX ORDER — DEXTROSE 50 % IN WATER 50 %
12.5 SYRINGE (ML) INTRAVENOUS ONCE
Refills: 0 | Status: DISCONTINUED | OUTPATIENT
Start: 2022-03-05 | End: 2022-03-07

## 2022-03-05 RX ORDER — GLUCAGON INJECTION, SOLUTION 0.5 MG/.1ML
1 INJECTION, SOLUTION SUBCUTANEOUS ONCE
Refills: 0 | Status: DISCONTINUED | OUTPATIENT
Start: 2022-03-05 | End: 2022-03-07

## 2022-03-05 RX ORDER — DEXTROSE 50 % IN WATER 50 %
25 SYRINGE (ML) INTRAVENOUS ONCE
Refills: 0 | Status: DISCONTINUED | OUTPATIENT
Start: 2022-03-05 | End: 2022-03-07

## 2022-03-05 RX ORDER — SODIUM CHLORIDE 9 MG/ML
1000 INJECTION, SOLUTION INTRAVENOUS
Refills: 0 | Status: DISCONTINUED | OUTPATIENT
Start: 2022-03-05 | End: 2022-03-07

## 2022-03-05 RX ORDER — POTASSIUM CHLORIDE 20 MEQ
40 PACKET (EA) ORAL ONCE
Refills: 0 | Status: COMPLETED | OUTPATIENT
Start: 2022-03-05 | End: 2022-03-05

## 2022-03-05 RX ADMIN — Medication 12.5 MILLIGRAM(S): at 07:39

## 2022-03-05 RX ADMIN — DONEPEZIL HYDROCHLORIDE 5 MILLIGRAM(S): 10 TABLET, FILM COATED ORAL at 21:23

## 2022-03-05 RX ADMIN — POLYETHYLENE GLYCOL 3350 17 GRAM(S): 17 POWDER, FOR SOLUTION ORAL at 13:09

## 2022-03-05 RX ADMIN — SODIUM CHLORIDE 3 MILLILITER(S): 9 INJECTION INTRAMUSCULAR; INTRAVENOUS; SUBCUTANEOUS at 14:00

## 2022-03-05 RX ADMIN — SODIUM CHLORIDE 3 MILLILITER(S): 9 INJECTION INTRAMUSCULAR; INTRAVENOUS; SUBCUTANEOUS at 21:06

## 2022-03-05 RX ADMIN — SENNA PLUS 2 TABLET(S): 8.6 TABLET ORAL at 21:24

## 2022-03-05 RX ADMIN — LEVETIRACETAM 500 MILLIGRAM(S): 250 TABLET, FILM COATED ORAL at 17:10

## 2022-03-05 RX ADMIN — SODIUM CHLORIDE 3 MILLILITER(S): 9 INJECTION INTRAMUSCULAR; INTRAVENOUS; SUBCUTANEOUS at 09:04

## 2022-03-05 RX ADMIN — HEPARIN SODIUM 5000 UNIT(S): 5000 INJECTION INTRAVENOUS; SUBCUTANEOUS at 07:39

## 2022-03-05 RX ADMIN — PANTOPRAZOLE SODIUM 40 MILLIGRAM(S): 20 TABLET, DELAYED RELEASE ORAL at 07:39

## 2022-03-05 RX ADMIN — QUETIAPINE FUMARATE 100 MILLIGRAM(S): 200 TABLET, FILM COATED ORAL at 21:23

## 2022-03-05 RX ADMIN — Medication 40 MILLIEQUIVALENT(S): at 13:08

## 2022-03-05 RX ADMIN — LIDOCAINE 1 PATCH: 4 CREAM TOPICAL at 13:09

## 2022-03-05 RX ADMIN — LIDOCAINE 1 PATCH: 4 CREAM TOPICAL at 18:24

## 2022-03-05 RX ADMIN — HEPARIN SODIUM 5000 UNIT(S): 5000 INJECTION INTRAVENOUS; SUBCUTANEOUS at 21:24

## 2022-03-05 RX ADMIN — Medication 650 MILLIGRAM(S): at 21:23

## 2022-03-05 RX ADMIN — HEPARIN SODIUM 5000 UNIT(S): 5000 INJECTION INTRAVENOUS; SUBCUTANEOUS at 13:09

## 2022-03-05 RX ADMIN — RALOXIFENE HYDROCHLORIDE 60 MILLIGRAM(S): 60 TABLET, COATED ORAL at 13:09

## 2022-03-05 RX ADMIN — ATORVASTATIN CALCIUM 10 MILLIGRAM(S): 80 TABLET, FILM COATED ORAL at 21:24

## 2022-03-05 RX ADMIN — LOSARTAN POTASSIUM 50 MILLIGRAM(S): 100 TABLET, FILM COATED ORAL at 07:39

## 2022-03-05 RX ADMIN — Medication 650 MILLIGRAM(S): at 22:20

## 2022-03-05 RX ADMIN — LEVETIRACETAM 500 MILLIGRAM(S): 250 TABLET, FILM COATED ORAL at 07:39

## 2022-03-05 RX ADMIN — Medication 2: at 22:23

## 2022-03-05 NOTE — PROGRESS NOTE ADULT - SUBJECTIVE AND OBJECTIVE BOX
Patient discussed on morning rounds with Dr. Rowley    Operation / Date: 3/1/22 R VATS, RA right lower lobectomy, MLND    SUBJECTIVE ASSESSMENT:  76y Female assessed at bedside with Son to translate (Tiaishanese speaking). Patient feels well, some pain, not significant. Denies SOB but is not using her IS- unable to successfully explain to her how to use. Denies fever, chills, nausea, vomiting.     Vital Signs Last 24 Hrs  T(C): 36.3 (05 Mar 2022 16:54), Max: 36.6 (05 Mar 2022 10:54)  T(F): 97.3 (05 Mar 2022 16:54), Max: 97.8 (05 Mar 2022 10:54)  HR: 79 (05 Mar 2022 16:03) (78 - 90)  BP: 132/61 (05 Mar 2022 16:03) (110/57 - 133/62)  BP(mean): 84 (05 Mar 2022 04:57) (80 - 93)  RR: 20 (05 Mar 2022 16:03) (17 - 20)  SpO2: 97% (05 Mar 2022 16:03) (94% - 97%)  I&O's Detail    04 Mar 2022 07:01  -  05 Mar 2022 07:00  --------------------------------------------------------  IN:  Total IN: 0 mL    OUT:    Chest Tube (mL): 60 mL    Chest Tube (mL): 54 mL    Voided (mL): 950 mL  Total OUT: 1064 mL    Total NET: -1064 mL    CHEST TUBE:  Yes. AIR LEAKS: No.  H2O SEAL.   GABBY DRAIN:  No.  EPICARDIAL WIRES: No.  TIE DOWNS: Yes  ROD: No.    Physical Exam  CONSTITUTIONAL: Well appearing in NAD assessed laying comfortably in bed   NEURO: A&OX3. No focal deficits noted, moving bilateral upper and lower extremities                    CV: RRR, no murmurs, rubs, gallops  RESPIRATORY: Clear to auscultation bilateral posterior lung fields, no wheezes, rales, rhonchi   GI: +BS, NT/ND  MUSKULOSKELETAL: No peripheral edema or calf tenderness. Full strength and ROM bilateral upper and lower extremities   VASCULAR: Bilateral distal pulses 2+  INCISIONS: R VATS incisions clean, dry, intact     LABS:                        13.2   6.78  )-----------( 194      ( 05 Mar 2022 05:04 )             41.3       COUMADIN:  No      03-05    140  |  102  |  24<H>  ----------------------------<  120<H>  3.6   |  26  |  0.76    Ca    8.7      05 Mar 2022 05:04  Mg     2.3     03-05      MEDICATIONS  (STANDING):  atorvastatin 10 milliGRAM(s) Oral at bedtime  donepezil 5 milliGRAM(s) Oral at bedtime  heparin   Injectable 5000 Unit(s) SubCutaneous every 8 hours  hydrochlorothiazide 12.5 milliGRAM(s) Oral daily  levETIRAcetam 500 milliGRAM(s) Oral two times a day  lidocaine   4% Patch 1 Patch Transdermal daily  losartan 50 milliGRAM(s) Oral daily  pantoprazole    Tablet 40 milliGRAM(s) Oral before breakfast  polyethylene glycol 3350 17 Gram(s) Oral daily  QUEtiapine 100 milliGRAM(s) Oral at bedtime  raloxifene 60 milliGRAM(s) Oral daily  senna 2 Tablet(s) Oral at bedtime  sodium chloride 0.9% lock flush 3 milliLiter(s) IV Push every 8 hours    MEDICATIONS  (PRN):  acetaminophen     Tablet .. 650 milliGRAM(s) Oral every 6 hours PRN Mild Pain (1 - 3)  traMADol 25 milliGRAM(s) Oral every 6 hours PRN Severe Pain (7 - 10)        RADIOLOGY & ADDITIONAL TESTS:    < from: Xray Chest 1 View- PORTABLE-Routine (Xray Chest 1 View- PORTABLE-Routine in AM.) (03.05.22 @ 05:51) >  INTERPRETATION:  Clinical History: Postop    Frontal examination of the chest demonstrates the heart to be within   normal limits in transverse diameter. Right chest tubes noted.   Subcutaneous emphysema overlying right lateral chest wall. No acute   infiltrates. Possible Small right effusion.    IMPRESSION: No acute infiltrates. Possible small right effusion    < end of copied text >

## 2022-03-06 LAB
ANION GAP SERPL CALC-SCNC: 9 MMOL/L — SIGNIFICANT CHANGE UP (ref 5–17)
BUN SERPL-MCNC: 28 MG/DL — HIGH (ref 7–23)
CALCIUM SERPL-MCNC: 8.7 MG/DL — SIGNIFICANT CHANGE UP (ref 8.4–10.5)
CHLORIDE SERPL-SCNC: 105 MMOL/L — SIGNIFICANT CHANGE UP (ref 96–108)
CO2 SERPL-SCNC: 26 MMOL/L — SIGNIFICANT CHANGE UP (ref 22–31)
CREAT SERPL-MCNC: 0.74 MG/DL — SIGNIFICANT CHANGE UP (ref 0.5–1.3)
EGFR: 83 ML/MIN/1.73M2 — SIGNIFICANT CHANGE UP
GLUCOSE BLDC GLUCOMTR-MCNC: 116 MG/DL — HIGH (ref 70–99)
GLUCOSE BLDC GLUCOMTR-MCNC: 132 MG/DL — HIGH (ref 70–99)
GLUCOSE BLDC GLUCOMTR-MCNC: 137 MG/DL — HIGH (ref 70–99)
GLUCOSE BLDC GLUCOMTR-MCNC: 142 MG/DL — HIGH (ref 70–99)
GLUCOSE SERPL-MCNC: 129 MG/DL — HIGH (ref 70–99)
HCT VFR BLD CALC: 39.5 % — SIGNIFICANT CHANGE UP (ref 34.5–45)
HGB BLD-MCNC: 12.8 G/DL — SIGNIFICANT CHANGE UP (ref 11.5–15.5)
MAGNESIUM SERPL-MCNC: 2.4 MG/DL — SIGNIFICANT CHANGE UP (ref 1.6–2.6)
MCHC RBC-ENTMCNC: 28.1 PG — SIGNIFICANT CHANGE UP (ref 27–34)
MCHC RBC-ENTMCNC: 32.4 GM/DL — SIGNIFICANT CHANGE UP (ref 32–36)
MCV RBC AUTO: 86.6 FL — SIGNIFICANT CHANGE UP (ref 80–100)
NRBC # BLD: 0 /100 WBCS — SIGNIFICANT CHANGE UP (ref 0–0)
PLATELET # BLD AUTO: 208 K/UL — SIGNIFICANT CHANGE UP (ref 150–400)
POTASSIUM SERPL-MCNC: 4 MMOL/L — SIGNIFICANT CHANGE UP (ref 3.5–5.3)
POTASSIUM SERPL-SCNC: 4 MMOL/L — SIGNIFICANT CHANGE UP (ref 3.5–5.3)
RBC # BLD: 4.56 M/UL — SIGNIFICANT CHANGE UP (ref 3.8–5.2)
RBC # FLD: 14 % — SIGNIFICANT CHANGE UP (ref 10.3–14.5)
SODIUM SERPL-SCNC: 140 MMOL/L — SIGNIFICANT CHANGE UP (ref 135–145)
WBC # BLD: 6.16 K/UL — SIGNIFICANT CHANGE UP (ref 3.8–10.5)
WBC # FLD AUTO: 6.16 K/UL — SIGNIFICANT CHANGE UP (ref 3.8–10.5)

## 2022-03-06 PROCEDURE — 71045 X-RAY EXAM CHEST 1 VIEW: CPT | Mod: 26,77

## 2022-03-06 PROCEDURE — 71045 X-RAY EXAM CHEST 1 VIEW: CPT | Mod: 26

## 2022-03-06 RX ORDER — CYCLOBENZAPRINE HYDROCHLORIDE 10 MG/1
5 TABLET, FILM COATED ORAL ONCE
Refills: 0 | Status: COMPLETED | OUTPATIENT
Start: 2022-03-06 | End: 2022-03-06

## 2022-03-06 RX ADMIN — SODIUM CHLORIDE 3 MILLILITER(S): 9 INJECTION INTRAMUSCULAR; INTRAVENOUS; SUBCUTANEOUS at 06:33

## 2022-03-06 RX ADMIN — Medication 650 MILLIGRAM(S): at 07:10

## 2022-03-06 RX ADMIN — SODIUM CHLORIDE 3 MILLILITER(S): 9 INJECTION INTRAMUSCULAR; INTRAVENOUS; SUBCUTANEOUS at 14:00

## 2022-03-06 RX ADMIN — LIDOCAINE 1 PATCH: 4 CREAM TOPICAL at 23:05

## 2022-03-06 RX ADMIN — LOSARTAN POTASSIUM 50 MILLIGRAM(S): 100 TABLET, FILM COATED ORAL at 07:08

## 2022-03-06 RX ADMIN — POLYETHYLENE GLYCOL 3350 17 GRAM(S): 17 POWDER, FOR SOLUTION ORAL at 12:08

## 2022-03-06 RX ADMIN — DONEPEZIL HYDROCHLORIDE 5 MILLIGRAM(S): 10 TABLET, FILM COATED ORAL at 21:51

## 2022-03-06 RX ADMIN — HEPARIN SODIUM 5000 UNIT(S): 5000 INJECTION INTRAVENOUS; SUBCUTANEOUS at 06:35

## 2022-03-06 RX ADMIN — LIDOCAINE 1 PATCH: 4 CREAM TOPICAL at 19:53

## 2022-03-06 RX ADMIN — ATORVASTATIN CALCIUM 10 MILLIGRAM(S): 80 TABLET, FILM COATED ORAL at 21:51

## 2022-03-06 RX ADMIN — QUETIAPINE FUMARATE 100 MILLIGRAM(S): 200 TABLET, FILM COATED ORAL at 21:51

## 2022-03-06 RX ADMIN — PANTOPRAZOLE SODIUM 40 MILLIGRAM(S): 20 TABLET, DELAYED RELEASE ORAL at 06:35

## 2022-03-06 RX ADMIN — CYCLOBENZAPRINE HYDROCHLORIDE 5 MILLIGRAM(S): 10 TABLET, FILM COATED ORAL at 09:54

## 2022-03-06 RX ADMIN — LIDOCAINE 1 PATCH: 4 CREAM TOPICAL at 11:49

## 2022-03-06 RX ADMIN — SODIUM CHLORIDE 3 MILLILITER(S): 9 INJECTION INTRAMUSCULAR; INTRAVENOUS; SUBCUTANEOUS at 21:25

## 2022-03-06 RX ADMIN — LEVETIRACETAM 500 MILLIGRAM(S): 250 TABLET, FILM COATED ORAL at 06:35

## 2022-03-06 RX ADMIN — HEPARIN SODIUM 5000 UNIT(S): 5000 INJECTION INTRAVENOUS; SUBCUTANEOUS at 13:37

## 2022-03-06 RX ADMIN — Medication 650 MILLIGRAM(S): at 08:10

## 2022-03-06 RX ADMIN — LEVETIRACETAM 500 MILLIGRAM(S): 250 TABLET, FILM COATED ORAL at 17:20

## 2022-03-06 RX ADMIN — RALOXIFENE HYDROCHLORIDE 60 MILLIGRAM(S): 60 TABLET, COATED ORAL at 11:49

## 2022-03-06 RX ADMIN — Medication 12.5 MILLIGRAM(S): at 07:08

## 2022-03-06 RX ADMIN — HEPARIN SODIUM 5000 UNIT(S): 5000 INJECTION INTRAVENOUS; SUBCUTANEOUS at 21:51

## 2022-03-06 NOTE — PHYSICAL THERAPY INITIAL EVALUATION ADULT - GENERAL OBSERVATIONS, REHAB EVAL
patient received seated out of bed with no acute distress. +EKG +chest tube to self suction +heplock +NC 2L/min +primafit

## 2022-03-06 NOTE — PHYSICAL THERAPY INITIAL EVALUATION ADULT - MANUAL MUSCLE TESTING RESULTS, REHAB EVAL
bilateral shoulder flex 3-/5, elbow flex 3-/5, hip flex 2/5, knee ext 3-/5 patient demonstrated poor effort, increased commands required

## 2022-03-06 NOTE — PHYSICAL THERAPY INITIAL EVALUATION ADULT - ADDITIONAL COMMENTS
patient was ambulating with Hand Held Assist in the community and independently without an assistive device in her home. Per psychosocial note 'Per son, patient has a cane, rollator and wheelchair to  assist with ambulation.'

## 2022-03-06 NOTE — PHYSICAL THERAPY INITIAL EVALUATION ADULT - PHYSICAL ASSIST/NONPHYSICAL ASSIST: SIT/STAND, REHAB EVAL
1.  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 Start Suzanne 128 gtts OU QHS. Reassurance given. 2.  LAILA w/ PEK OU- The increase of artificial tears OU Q2hwa were recommended. 3.  Glaucoma Suspect OU (0.8 OU/ () FHX): Stable IOP OU. Past w/u normal OU. Patient is considered high risk. 4.  Pseudophakia OU- Doing well. 5.  Return for an appointment as scheduled in April with Dr. No Rowe. verbal cues/nonverbal cues (demo/gestures)/2 person assist

## 2022-03-06 NOTE — PHYSICAL THERAPY INITIAL EVALUATION ADULT - IMPAIRMENTS FOUND, PT EVAL
aerobic capacity/endurance/arousal, attention, and cognition/cognitive impairment/gait, locomotion, and balance/muscle strength/posture

## 2022-03-06 NOTE — PHYSICAL THERAPY INITIAL EVALUATION ADULT - MODALITIES TREATMENT COMMENTS
cervical PROM performed, patient had forward cervical posture/head drop, per son due to sleeping in the chair all night

## 2022-03-06 NOTE — PHYSICAL THERAPY INITIAL EVALUATION ADULT - GAIT DEVIATIONS NOTED, PT EVAL
weightshifting assist provided, leaning to left side, increased c/o pain, max encouragement provided, unable to keep head up, forward head posture/head drop

## 2022-03-06 NOTE — PROGRESS NOTE ADULT - SUBJECTIVE AND OBJECTIVE BOX
Patient discussed on morning rounds with Dr. Nabil Dey    Operation / Date: 3/1/22 R VATS, RA right lower lobectomy, MLND    SUBJECTIVE ASSESSMENT:  76y Female assessed at bedside with son to translate.         Vital Signs Last 24 Hrs  T(C): 36.1 (06 Mar 2022 18:05), Max: 36.3 (06 Mar 2022 09:17)  T(F): 96.9 (06 Mar 2022 18:05), Max: 97.4 (06 Mar 2022 14:39)  HR: 75 (06 Mar 2022 15:28) (68 - 98)  BP: 156/70 (06 Mar 2022 15:28) (118/60 - 180/86)  BP(mean): 94 (06 Mar 2022 10:45) (90 - 123)  RR: 18 (06 Mar 2022 08:30) (16 - 18)  SpO2: 96% (06 Mar 2022 15:28) (95% - 100%)  I&O's Detail    05 Mar 2022 07:01  -  06 Mar 2022 07:00  --------------------------------------------------------  IN:  Total IN: 0 mL    OUT:    Chest Tube (mL): 80 mL    Voided (mL): 950 mL  Total OUT: 1030 mL    Total NET: -1030 mL      06 Mar 2022 07:01  -  06 Mar 2022 19:40  --------------------------------------------------------  IN:    Oral Fluid: 500 mL  Total IN: 500 mL    OUT:    Chest Tube (mL): 100 mL  Total OUT: 100 mL    Total NET: 400 mL          CHEST TUBE:  Yes/No. AIR LEAKS: Yes/No. Suction / H2O SEAL.   GABBY DRAIN:  Yes/No.  EPICARDIAL WIRES: Yes/No.  TIE DOWNS: Yes/No.  ROD: Yes/No.    PHYSICAL EXAM:    General:     Neurological:    Cardiovascular:    Respiratory:    Gastrointestinal:    Extremities:    Vascular:    Incision Sites:    LABS:                        12.8   6.16  )-----------( 208      ( 06 Mar 2022 06:23 )             39.5       COUMADIN:  Yes/No. REASON: .        03-06    140  |  105  |  28<H>  ----------------------------<  129<H>  4.0   |  26  |  0.74    Ca    8.7      06 Mar 2022 06:23  Mg     2.4     03-06            MEDICATIONS  (STANDING):  atorvastatin 10 milliGRAM(s) Oral at bedtime  dextrose 40% Gel 15 Gram(s) Oral once  dextrose 5%. 1000 milliLiter(s) (50 mL/Hr) IV Continuous <Continuous>  dextrose 5%. 1000 milliLiter(s) (100 mL/Hr) IV Continuous <Continuous>  dextrose 50% Injectable 25 Gram(s) IV Push once  dextrose 50% Injectable 12.5 Gram(s) IV Push once  dextrose 50% Injectable 25 Gram(s) IV Push once  donepezil 5 milliGRAM(s) Oral at bedtime  glucagon  Injectable 1 milliGRAM(s) IntraMuscular once  heparin   Injectable 5000 Unit(s) SubCutaneous every 8 hours  hydrochlorothiazide 12.5 milliGRAM(s) Oral daily  insulin lispro (ADMELOG) corrective regimen sliding scale   SubCutaneous Before meals and at bedtime  levETIRAcetam 500 milliGRAM(s) Oral two times a day  lidocaine   4% Patch 1 Patch Transdermal daily  losartan 50 milliGRAM(s) Oral daily  pantoprazole    Tablet 40 milliGRAM(s) Oral before breakfast  polyethylene glycol 3350 17 Gram(s) Oral daily  QUEtiapine 100 milliGRAM(s) Oral at bedtime  raloxifene 60 milliGRAM(s) Oral daily  senna 2 Tablet(s) Oral at bedtime  sodium chloride 0.9% lock flush 3 milliLiter(s) IV Push every 8 hours    MEDICATIONS  (PRN):  acetaminophen     Tablet .. 650 milliGRAM(s) Oral every 6 hours PRN Mild Pain (1 - 3)  traMADol 25 milliGRAM(s) Oral every 6 hours PRN Severe Pain (7 - 10)        RADIOLOGY & ADDITIONAL TESTS:     Patient discussed on morning rounds with Dr. Nabil Dey    Operation / Date: 3/1/22 R VATS, RA right lower lobectomy, MLND    SUBJECTIVE ASSESSMENT:  76y Female assessed at bedside with son to translate. Patient complains of neck stiffness this morning making it hard to move her arms. Complains she was in the chair too long leading to this. Also endorses general pain. Denies SOB. Denies fever, chills, nausea, vomiting. Not using her IS. Not ambulating in weaver (unable to per RNs and son, too weak).     Vital Signs Last 24 Hrs  T(C): 36.1 (06 Mar 2022 18:05), Max: 36.3 (06 Mar 2022 09:17)  T(F): 96.9 (06 Mar 2022 18:05), Max: 97.4 (06 Mar 2022 14:39)  HR: 75 (06 Mar 2022 15:28) (68 - 98)  BP: 156/70 (06 Mar 2022 15:28) (118/60 - 180/86)  BP(mean): 94 (06 Mar 2022 10:45) (90 - 123)  RR: 18 (06 Mar 2022 08:30) (16 - 18)  SpO2: 96% (06 Mar 2022 15:28) (95% - 100%)  I&O's Detail    05 Mar 2022 07:01  -  06 Mar 2022 07:00  --------------------------------------------------------  IN:  Total IN: 0 mL    OUT:    Chest Tube (mL): 80 mL    Voided (mL): 950 mL  Total OUT: 1030 mL    Total NET: -1030 mL      06 Mar 2022 07:01  -  06 Mar 2022 19:40  --------------------------------------------------------  IN:    Oral Fluid: 500 mL  Total IN: 500 mL    OUT:    Chest Tube (mL): 100 mL  Total OUT: 100 mL    Total NET: 400 mL    CHEST TUBE:  No.   GABBY DRAIN:  No.  EPICARDIAL WIRES: No.  TIE DOWNS: Yes  ROD: No.    Physical Exam  CONSTITUTIONAL: Well appearing in NAD assessed laying comfortably in bed   NEURO: A&OX3. No focal deficits noted, moving bilateral upper and lower extremities                    CV: RRR, no murmurs, rubs, gallops  RESPIRATORY: Clear to auscultation bilateral posterior lung fields, no wheezes, rales, rhonchi   GI: +BS, NT/ND  MUSKULOSKELETAL: No peripheral edema or calf tenderness. Full strength and ROM bilateral upper and lower extremities   VASCULAR: Bilateral distal pulses 2+  INCISIONS: R VATS incisions clean, dry, intact     LABS:                        12.8   6.16  )-----------( 208      ( 06 Mar 2022 06:23 )             39.5       COUMADIN:  No    03-06    140  |  105  |  28<H>  ----------------------------<  129<H>  4.0   |  26  |  0.74    Ca    8.7      06 Mar 2022 06:23  Mg     2.4     03-06      MEDICATIONS  (STANDING):  atorvastatin 10 milliGRAM(s) Oral at bedtime  dextrose 40% Gel 15 Gram(s) Oral once  dextrose 5%. 1000 milliLiter(s) (50 mL/Hr) IV Continuous <Continuous>  dextrose 5%. 1000 milliLiter(s) (100 mL/Hr) IV Continuous <Continuous>  dextrose 50% Injectable 25 Gram(s) IV Push once  dextrose 50% Injectable 12.5 Gram(s) IV Push once  dextrose 50% Injectable 25 Gram(s) IV Push once  donepezil 5 milliGRAM(s) Oral at bedtime  glucagon  Injectable 1 milliGRAM(s) IntraMuscular once  heparin   Injectable 5000 Unit(s) SubCutaneous every 8 hours  hydrochlorothiazide 12.5 milliGRAM(s) Oral daily  insulin lispro (ADMELOG) corrective regimen sliding scale   SubCutaneous Before meals and at bedtime  levETIRAcetam 500 milliGRAM(s) Oral two times a day  lidocaine   4% Patch 1 Patch Transdermal daily  losartan 50 milliGRAM(s) Oral daily  pantoprazole    Tablet 40 milliGRAM(s) Oral before breakfast  polyethylene glycol 3350 17 Gram(s) Oral daily  QUEtiapine 100 milliGRAM(s) Oral at bedtime  raloxifene 60 milliGRAM(s) Oral daily  senna 2 Tablet(s) Oral at bedtime  sodium chloride 0.9% lock flush 3 milliLiter(s) IV Push every 8 hours    MEDICATIONS  (PRN):  acetaminophen     Tablet .. 650 milliGRAM(s) Oral every 6 hours PRN Mild Pain (1 - 3)  traMADol 25 milliGRAM(s) Oral every 6 hours PRN Severe Pain (7 - 10)        RADIOLOGY & ADDITIONAL TESTS:    x< from: Xray Chest 1 View- PORTABLE-Routine (Xray Chest 1 View- PORTABLE-Routine in AM.) (03.06.22 @ 05:24) >  INTERPRETATION:  Clinical History: Postop    Frontal examination of the chest demonstrates the heart to be within   normal limits in transverse diameter. Improvement right basilar   infiltrate and/or  effusion in comparison to prior examination of the   chest 3/5/2022. Right chest tube. Subcutaneous emphysema overlying right   lateral chest wall.    IMPRESSION: Improvement right basilar infiltrate and/or effusion    < end of copied text >

## 2022-03-06 NOTE — PHYSICAL THERAPY INITIAL EVALUATION ADULT - PERTINENT HX OF CURRENT PROBLEM, REHAB EVAL
76 year old female with recent CT chest done on 1/9/22 revealing RLL spiculated mass. CT chest completed upon pre-op workup for cataract surgery.

## 2022-03-06 NOTE — PHYSICAL THERAPY INITIAL EVALUATION ADULT - IMPAIRED TRANSFERS: SIT/STAND, REHAB EVAL
static stand with mod assist, leaning over to left side (may be due to painful chest tube insertion site), forward head posture/impaired balance/pain/decreased strength

## 2022-03-06 NOTE — CHART NOTE - NSCHARTNOTEFT_GEN_A_CORE
CT Removal:    Pt seen and examined at bedside.  Case discussed with Dr. Nabil Dey.  Minimal output from CTs.  No air leak appreciated.  CT removed without incident per Dr. Nabil Dey request.  Occlusive DSD placed.  CXR no obvious PTX noted.  Pt tolerated procedure well.
CT Removal:    Pt seen and examined at bedside.  Case discussed with Dr. Nabil Dey.  Minimal output from CTs.  No air leak appreciated.  CT removed without incident per Dr. Nabil Dey request.  Occlusive DSD placed.  CXR no obvious PTX noted.  Pt tolerated procedure well.

## 2022-03-07 ENCOUNTER — TRANSCRIPTION ENCOUNTER (OUTPATIENT)
Age: 77
End: 2022-03-07

## 2022-03-07 VITALS
OXYGEN SATURATION: 92 % | RESPIRATION RATE: 18 BRPM | HEART RATE: 82 BPM | DIASTOLIC BLOOD PRESSURE: 70 MMHG | SYSTOLIC BLOOD PRESSURE: 137 MMHG

## 2022-03-07 LAB
ANION GAP SERPL CALC-SCNC: 10 MMOL/L — SIGNIFICANT CHANGE UP (ref 5–17)
BUN SERPL-MCNC: 26 MG/DL — HIGH (ref 7–23)
CALCIUM SERPL-MCNC: 9 MG/DL — SIGNIFICANT CHANGE UP (ref 8.4–10.5)
CHLORIDE SERPL-SCNC: 106 MMOL/L — SIGNIFICANT CHANGE UP (ref 96–108)
CO2 SERPL-SCNC: 27 MMOL/L — SIGNIFICANT CHANGE UP (ref 22–31)
CREAT SERPL-MCNC: 0.76 MG/DL — SIGNIFICANT CHANGE UP (ref 0.5–1.3)
EGFR: 81 ML/MIN/1.73M2 — SIGNIFICANT CHANGE UP
GLUCOSE BLDC GLUCOMTR-MCNC: 106 MG/DL — HIGH (ref 70–99)
GLUCOSE SERPL-MCNC: 113 MG/DL — HIGH (ref 70–99)
HCT VFR BLD CALC: 40.2 % — SIGNIFICANT CHANGE UP (ref 34.5–45)
HGB BLD-MCNC: 12.5 G/DL — SIGNIFICANT CHANGE UP (ref 11.5–15.5)
MAGNESIUM SERPL-MCNC: 2.4 MG/DL — SIGNIFICANT CHANGE UP (ref 1.6–2.6)
MCHC RBC-ENTMCNC: 27.5 PG — SIGNIFICANT CHANGE UP (ref 27–34)
MCHC RBC-ENTMCNC: 31.1 GM/DL — LOW (ref 32–36)
MCV RBC AUTO: 88.4 FL — SIGNIFICANT CHANGE UP (ref 80–100)
NRBC # BLD: 0 /100 WBCS — SIGNIFICANT CHANGE UP (ref 0–0)
PLATELET # BLD AUTO: 198 K/UL — SIGNIFICANT CHANGE UP (ref 150–400)
POTASSIUM SERPL-MCNC: 4.4 MMOL/L — SIGNIFICANT CHANGE UP (ref 3.5–5.3)
POTASSIUM SERPL-SCNC: 4.4 MMOL/L — SIGNIFICANT CHANGE UP (ref 3.5–5.3)
RBC # BLD: 4.55 M/UL — SIGNIFICANT CHANGE UP (ref 3.8–5.2)
RBC # FLD: 14.2 % — SIGNIFICANT CHANGE UP (ref 10.3–14.5)
SODIUM SERPL-SCNC: 143 MMOL/L — SIGNIFICANT CHANGE UP (ref 135–145)
WBC # BLD: 6.34 K/UL — SIGNIFICANT CHANGE UP (ref 3.8–10.5)
WBC # FLD AUTO: 6.34 K/UL — SIGNIFICANT CHANGE UP (ref 3.8–10.5)

## 2022-03-07 PROCEDURE — S2900: CPT

## 2022-03-07 PROCEDURE — 83735 ASSAY OF MAGNESIUM: CPT

## 2022-03-07 PROCEDURE — 86850 RBC ANTIBODY SCREEN: CPT

## 2022-03-07 PROCEDURE — C1889: CPT

## 2022-03-07 PROCEDURE — 82330 ASSAY OF CALCIUM: CPT

## 2022-03-07 PROCEDURE — 84443 ASSAY THYROID STIM HORMONE: CPT

## 2022-03-07 PROCEDURE — 84132 ASSAY OF SERUM POTASSIUM: CPT

## 2022-03-07 PROCEDURE — 88309 TISSUE EXAM BY PATHOLOGIST: CPT

## 2022-03-07 PROCEDURE — 85027 COMPLETE CBC AUTOMATED: CPT

## 2022-03-07 PROCEDURE — 88305 TISSUE EXAM BY PATHOLOGIST: CPT

## 2022-03-07 PROCEDURE — 97161 PT EVAL LOW COMPLEX 20 MIN: CPT

## 2022-03-07 PROCEDURE — 71045 X-RAY EXAM CHEST 1 VIEW: CPT | Mod: 26

## 2022-03-07 PROCEDURE — 86900 BLOOD TYPING SEROLOGIC ABO: CPT

## 2022-03-07 PROCEDURE — 36415 COLL VENOUS BLD VENIPUNCTURE: CPT

## 2022-03-07 PROCEDURE — 82947 ASSAY GLUCOSE BLOOD QUANT: CPT

## 2022-03-07 PROCEDURE — 82962 GLUCOSE BLOOD TEST: CPT

## 2022-03-07 PROCEDURE — 84295 ASSAY OF SERUM SODIUM: CPT

## 2022-03-07 PROCEDURE — 71045 X-RAY EXAM CHEST 1 VIEW: CPT

## 2022-03-07 PROCEDURE — 82803 BLOOD GASES ANY COMBINATION: CPT

## 2022-03-07 PROCEDURE — 85610 PROTHROMBIN TIME: CPT

## 2022-03-07 PROCEDURE — 86901 BLOOD TYPING SEROLOGIC RH(D): CPT

## 2022-03-07 PROCEDURE — 94002 VENT MGMT INPAT INIT DAY: CPT

## 2022-03-07 PROCEDURE — 85025 COMPLETE CBC W/AUTO DIFF WBC: CPT

## 2022-03-07 PROCEDURE — 83036 HEMOGLOBIN GLYCOSYLATED A1C: CPT

## 2022-03-07 PROCEDURE — 80048 BASIC METABOLIC PNL TOTAL CA: CPT

## 2022-03-07 PROCEDURE — 85730 THROMBOPLASTIN TIME PARTIAL: CPT

## 2022-03-07 PROCEDURE — 85014 HEMATOCRIT: CPT

## 2022-03-07 RX ORDER — ACETAMINOPHEN 500 MG
2 TABLET ORAL
Qty: 56 | Refills: 0
Start: 2022-03-07 | End: 2022-03-13

## 2022-03-07 RX ORDER — ACETAMINOPHEN 500 MG
2 TABLET ORAL
Qty: 0 | Refills: 0 | DISCHARGE
Start: 2022-03-07

## 2022-03-07 RX ADMIN — LOSARTAN POTASSIUM 50 MILLIGRAM(S): 100 TABLET, FILM COATED ORAL at 05:21

## 2022-03-07 RX ADMIN — Medication 12.5 MILLIGRAM(S): at 05:21

## 2022-03-07 RX ADMIN — HEPARIN SODIUM 5000 UNIT(S): 5000 INJECTION INTRAVENOUS; SUBCUTANEOUS at 05:21

## 2022-03-07 RX ADMIN — SODIUM CHLORIDE 3 MILLILITER(S): 9 INJECTION INTRAMUSCULAR; INTRAVENOUS; SUBCUTANEOUS at 05:55

## 2022-03-07 RX ADMIN — PANTOPRAZOLE SODIUM 40 MILLIGRAM(S): 20 TABLET, DELAYED RELEASE ORAL at 06:17

## 2022-03-07 RX ADMIN — LEVETIRACETAM 500 MILLIGRAM(S): 250 TABLET, FILM COATED ORAL at 05:21

## 2022-03-07 NOTE — DISCHARGE NOTE PROVIDER - NSDCMRMEDTOKEN_GEN_ALL_CORE_FT
acetaminophen 325 mg oral tablet: 2 tab(s) orally every 6 hours, As needed, Mild Pain (1 - 3)  donepezil 5 mg oral tablet: 1 tab(s) orally once a day (at bedtime)  Evista 60 mg oral tablet: 1 tab(s) orally once a day  Jardiance 25 mg oral tablet: 1 tab(s) orally once a day (in the morning)  levETIRAcetam 500 mg oral tablet: 1 tab(s) orally 2 times a day  Lipitor 10 mg oral tablet: 1 tab(s) orally once a day  losartan-hydrochlorothiazide 50 mg-12.5 mg oral tablet: 1 tab(s) orally once a day  Oyster Colin 500 oral tablet: 1 tab(s) orally 2 times a day  QUEtiapine 100 mg oral tablet: orally once a day (at bedtime)  Rolling Walker: Please prescribe one rolling walker per patient insurance     ICD10: R91.1   acetaminophen 325 mg oral tablet: 2 tab(s) orally every 6 hours, As needed, Mild Pain (1 - 3) MDD:8  donepezil 5 mg oral tablet: 1 tab(s) orally once a day (at bedtime)  Evista 60 mg oral tablet: 1 tab(s) orally once a day  Jardiance 25 mg oral tablet: 1 tab(s) orally once a day (in the morning)  levETIRAcetam 500 mg oral tablet: 1 tab(s) orally 2 times a day  Lipitor 10 mg oral tablet: 1 tab(s) orally once a day  losartan-hydrochlorothiazide 50 mg-12.5 mg oral tablet: 1 tab(s) orally once a day  Oyster Colin 500 oral tablet: 1 tab(s) orally 2 times a day  QUEtiapine 100 mg oral tablet: orally once a day (at bedtime)  Rolling Walker: Please prescribe one rolling walker per patient insurance     ICD10: R91.1

## 2022-03-07 NOTE — DISCHARGE NOTE NURSING/CASE MANAGEMENT/SOCIAL WORK - NSDCPEFALRISK_GEN_ALL_CORE
For information on Fall & Injury Prevention, visit: [email or web address] OR  [email or web address] OR  https://www.cdc.gov/steadi/patient.html

## 2022-03-07 NOTE — DISCHARGE NOTE PROVIDER - CARE PROVIDER_API CALL
Kashif Bermudez  [facility]  [address] [place], [place]  Phone: [phone]  Fax: (   )    -  Scheduled Appointment: 03/15/2022    LIZZY ALVAREZ  Pulmonary Diseases  [address] [place] [address] [address]  [place] [address]  Phone: [phone]  Fax: [phone]  Follow Up Time: 2 weeks    BENTON DELUNA  Internal Medicine  [address] [place] [address], [facility] [address]  [place] [address]  Phone: ([phone]  Fax: ()-  Follow Up Time:

## 2022-03-07 NOTE — DISCHARGE NOTE PROVIDER - NSDCFUADDAPPT_GEN_ALL_CORE_FT
-You will follow up with Dr. Bermudez in about 1 week  -our office will make an appointment for your  -if you do not hear from us by this afternoon please call us a [phone]

## 2022-03-07 NOTE — PROGRESS NOTE ADULT - PROVIDER SPECIALTY LIST ADULT
Thoracic Surgery
Critical Care
Critical Care
Thoracic Surgery

## 2022-03-07 NOTE — DISCHARGE NOTE NURSING/CASE MANAGEMENT/SOCIAL WORK - NSDCFUADDAPPT_GEN_ALL_CORE_FT
-You will follow up with Dr. Bermudez in about 1 week  -our office will make an appointment for your  -if you do not hear from us by this afternoon please call us a [phone]

## 2022-03-07 NOTE — DISCHARGE NOTE PROVIDER - NSDCCPTREATMENT_GEN_ALL_CORE_FT
PRINCIPAL PROCEDURE  Procedure: Lobectomy, lung, robot-assisted, using VATS  Findings and Treatment: R VATS RA Right Lower Lobectomy MLND

## 2022-03-07 NOTE — DISCHARGE NOTE PROVIDER - PROVIDER TOKENS
FREE:[LAST:[Aidan],FIRST:[Kashif],PHONE:[phone]],FAX:[(   )    -],ADDRESS:[[address]],SCHEDULEDAPPT:[03/15/2022]],PROVIDER:[TOKEN:[[id number]],FOLLOWUP:[2 weeks]],PROVIDER:[TOKEN:[[id number]]]

## 2022-03-07 NOTE — DISCHARGE NOTE NURSING/CASE MANAGEMENT/SOCIAL WORK - PATIENT PORTAL LINK FT
You can access the FollowMyHealth Patient Portal offered by [facility] by registering at the following website: [email or web address]. By joining [facility]’s FollowMyHealth portal, you will also be able to view your health information using other applications (apps) compatible with our system.

## 2022-03-07 NOTE — PROGRESS NOTE ADULT - SUBJECTIVE AND OBJECTIVE BOX
Patient discussed on morning rounds with       Operation / Date: 3/1: R VATS RA Right Lower Lobectomy MLND    Surgeon: Dr. Bermudez     Referring Physician: Dr. Parisi    SUBJECTIVE ASSESSMENT AND HOSPITAL COURSE:  76 year old female, with PMHx of DM, HTN, HLD, Osteoporosis, Dementia, Schizophrenia, with RLL spiculated nodule found on CT chest s/p EBUS with bx + Adenocarcinoma. She was referred to Dr. Bermudez for surgical evaluation and deemed a good surgical candidate. On 3/1 she underwent R VATS RA Right Lower Lobectomy with MLND. Procedure was uncomplicated, at end of procedure patient was noted to be lethargic and not ready for extubation. She was transferred to CTICU intubated and stable. She was later extubated on POD#0. On POD#1 patient required robles replacement for urinary retention. She otherwise remained stable and transferred to  on POD#1. Robles was incidentally removed on POD#1 by patient and she later passed TOV. POD#2 remained stable with chest tubes on water seal. POD#4 one chest tube removed after successful clamp trial. POD#5 remaining CT removed after clamp trial. Worked with PT and unable to ambulate more than a few steps, baseline per family. POD5 patient remains stable, oxygen titrated off. CXRay significant for RUL space, stable since chest tube removal.     Patient was seen and examined this morning, care was discussed with Dr. Hunter and deemed medically appropriate for discharge with outpatient follow up. Patient was hemodynamically stable and afebrile overnight and feels comfortable being discharged home this AM.     Over 35 minutes was spent with the patient and patient son and daughter reviewing the discharge material including medications, follow up appointments, recovery, concerning symptoms, and how to contact their health care providers if they have questions       Vital Signs Last 24 Hrs  T(C): 36.1 (07 Mar 2022 08:59), Max: 36.6 (06 Mar 2022 22:06)  T(F): 97 (07 Mar 2022 08:59), Max: 97.8 (06 Mar 2022 22:06)  HR: 82 (07 Mar 2022 08:00) (62 - 82)  BP: 148/66 (07 Mar 2022 05:10) (102/57 - 156/70)  BP(mean): 95 (07 Mar 2022 05:10) (76 - 95)  RR: 17 (07 Mar 2022 08:00) (16 - 18)  SpO2: 93% (07 Mar 2022 08:00) (93% - 96%)    EPICARDIAL WIRES REMOVED: N/A  TIE DOWNS REMOVED: No.    PHYSICAL EXAM:  Neuro: A+O x 3, non-focal, speech clear and intact (per family)  HEENT: PERRL, EOMI, oral mucosa pink and moist  Neck: supple, no JVD  CV: regular rate, regular rhythm, +S1S2, no murmurs or rub  Pulm/chest: lung sounds CTA and equal bilaterally, no accessory muscle use noted  Abd: soft, NT, ND, +BS  Ext: DEMPSEY x 4, no C/C/E  Skin: warm, well perfused, no rashes   Incision Sites: Thoracic port sites open to air, Tie down with c/d/i dressing and tie down in place.     LABS:                        12.5   6.34  )-----------( 198      ( 07 Mar 2022 05:54 )             40.2           03-07    143  |  106  |  26<H>  ----------------------------<  113<H>  4.4   |  27  |  0.76    Ca    9.0      07 Mar 2022 05:54  Mg     2.4     03-07            Discharge CXR:  < from: Xray Chest 1 View- PORTABLE-Routine (Xray Chest 1 View- PORTABLE-Routine in AM.) (03.07.22 @ 05:23) >  FINDINGS:    Single frontal view of the chest demonstrates right apical pneumothorax,   unchanged. Small right-sided effusion. Right lower thoracic wall   subcutaneous emphysema. The cardiomediastinal silhouette is enlarged. No   acute osseous abnormalities. Overlying EKG leads and wires are noted    IMPRESSION: No interval change.    --- End of Report ---    < end of copied text >

## 2022-03-07 NOTE — DISCHARGE NOTE PROVIDER - HOSPITAL COURSE
76 year old female, with PMHx of DM, HTN, HLD, Osteoporosis, Dementia, Schizophrenia, with RLL spiculated nodule found on CT chest s/p EBUS with bx + Adenocarcinoma. She was referred to Dr. Bermudez for surgical evaluation and deemed a good surgical candidate. On 3/1 she underwent R VATS RA Right Lower Lobectomy with MLND. Procedure was uncomplicated, at end of procedure patient was noted to be lethargic and not ready for extubation. She was transferred to CTICU intubated and stable. She was later extubated on POD#0. On POD#1 patient required robles replacement for urinary retention. She otherwise remained stable and transferred to  on POD#1. Robles was incidentally removed on POD#1 by patient and she later passed TOV. POD#2 remained stable with chest tubes on water seal. POD#4 one chest tube removed after successful clamp trial. POD#5 remaining CT removed after clamp trial. Worked with PT and unable to ambulate more than a few steps, baseline per family. POD5 patient remains stable, oxygen titrated off. CXRay significant for RUL space, stable since chest tube removal.     Patient was seen and examined this morning, care was discussed with Dr. Hunter and deemed medically appropriate for discharge with outpatient follow up. Patient was hemodynamically stable and afebrile overnight and feels comfortable being discharged home this AM.     Over 35 minutes was spent with the patient and patient son and daughter reviewing the discharge material including medications, follow up appointments, recovery, concerning symptoms, and how to contact their health care providers if they have questions

## 2022-03-07 NOTE — PROGRESS NOTE ADULT - ASSESSMENT
A/P:    76 year old female, with PMHx of DM, HTN, HLD, Osteoporosis, Dementia, Schizophrenia, with RLL spiculated nodule found on CT chest s/p EBUS with bx + Adenocarcinoma. She was referred to Dr. Bermudez for surgical evaluation and deemed a good surgical candidate. On 3/1 she underwent R VATS RA Right Lower Lobectomy with MLND. Procedure was uncomplicated, at end of procedure patient was noted to be lethargic and not ready for extubation. She was transferred to CTICU intubated and stable. She was later extubated on POD#0. On POD#1 patient required robles replacement for urinary retention. She otherwise remained stable and transferred to  on POD#1. Robles was incidentally removed on POD#1 by patient and she later passed TOV. POD#2 remained stable with chest tubes on water seal. POD#4 one chest tube removed after successful clamp trial.     Neurovascular:   - Pain well controlled with current regimen.  -Continue tylenol, tramadol PRN  - Continue lidocaine patch  - Hx Dementia/ Schizophrenia: continue donepezil 5 mg QHS, seroquel 100 mg QHS, Keppra 500  mg daily     Cardiovascular:   -Hemodynamically stable. HR controlled (78-94)  - Hx of HTN, BP controlled (110//83), continue losartan 50 mg daily  - Hx HLD: continue atorvastatin 10 mg daily     Respiratory:   - POD4 s/p R VATS, RA Right lower lobectomy, MLND (+adenocarcinoma)  - 1 CT removed POD4, 1 CT remains on water seal with no leak  - 02 Sat = 96% on RA.  -Encourage C+DB and Use of IS 10x / hr while awake.  -CXR stable, no obvious PTX     GI:   - Stable.  -Continue GI PPX with protonix  -PO Diet.    Renal / :  - BUN/Cr stable at 24/0.76  -Continue to monitor renal function.  -Monitor I/O's.  - Replete electrolytes PRN  - Continue diuresis with hydrochlorothiazide 12.5 mg daily     Endocrine:    -A1c 6.8, known hx DM, continue MISS  -TSH 2.260, no known hx thyroid disease     Hematologic:  -H/H stable at 13.2/41.3 with no obvious signs of bleeding  -Coagulation Panel.  - Continue home oral chemo med: raloxifene 60 mg daily     ID:  -Pt remains afebrile with no elevation in WBC or signs of infection  -Continue to monitor CBC  -Observe for SIRS/Sepsis Syndrome.    Prophylaxis:  -DVT prophylaxis with 5000 SubQ Heparin q8h.  -SCD's    Disposition:  -Home when medically appropriate pending CT management and PT workup  
Med Reconciliation:  Medication Reconciliation Status	Admission Reconciliation is Completed  Discharge Reconciliation is Completed  Discharge Medications	acetaminophen 325 mg oral tablet: 2 tab(s) orally every 6 hours, As needed, Mild Pain (1 - 3)  donepezil 5 mg oral tablet: 1 tab(s) orally once a day (at bedtime)  Evista 60 mg oral tablet: 1 tab(s) orally once a day  Jardiance 25 mg oral tablet: 1 tab(s) orally once a day (in the morning)  levETIRAcetam 500 mg oral tablet: 1 tab(s) orally 2 times a day  Lipitor 10 mg oral tablet: 1 tab(s) orally once a day  losartan-hydrochlorothiazide 50 mg-12.5 mg oral tablet: 1 tab(s) orally once a day  Oyster Colin 500 oral tablet: 1 tab(s) orally 2 times a day  QUEtiapine 100 mg oral tablet: orally once a day (at bedtime)  Rolling Walker: Please prescribe one rolling walker per patient insurance     ICD10: R91.1  ,  ,     Care Plan/Procedures:  Discharge Diagnoses, Assessment and Plan of Treatment	PRINCIPAL DISCHARGE DIAGNOSIS  Diagnosis: Lung nodule  Assessment and Plan of Treatment:  Discharge Procedures, Findings and Treatment	PRINCIPAL PROCEDURE  Procedure: Lobectomy, lung, robot-assisted, using VATS  Findings and Treatment: R VATS RA Right Lower Lobectomy MLND  Goal(s)	To get better and follow your care plan as instructed.     Follow Up:  Care Providers for Follow up (PCP/Outpatient Provider)	Kashif Bermudez  [facility]  [address] [place] [address]  Phone: [phone]  Fax: (   )    -  Scheduled Appointment: 03/15/2022    LIZZY ALVAREZ  Pulmonary Diseases  [address] [place] [address] [address]  [place] [address]  Phone: [phone]  Fax: [phone]  Follow Up Time: 2 weeks    BENTON DELUNA  Internal Medicine  [address] [facility], [address]  [place] [address]  Phone: ()-  Fax: ()-  Follow Up Time:  Patient's Scheduled Appointments	ASHLEY SAMANIEGO ; 03/15/2022 ; [address]  Additional Scheduled Appointments	-You will follow up with Dr. Bermudez in about 1 week  -our office will make an appointment for your  -if you do not hear from us by this afternoon please call us a [phone]  Discharge Diet	DASH Diet  Activity	Do not drive or operate machinery, Do not make important decisions, No heavy lifting/straining, Walking - Indoors allowed, Walking - Outdoors allowed, Follow Instructions Provided by your Surgical Team  Additional Instructions	-Walk daily as tolerated and use your incentive spirometer every hour.    -No driving or strenuous activity/exercise for 6 weeks, or until cleared by your surgeon.    -Gently clean your incisions with anti-bacterial soap and water, pat dry.  Dressing should be kept in place until your follow up appointment. Should the dressing fall off please please a new dry bandage over the surgical site.     -Call your doctor if you have shortness of breath, chest pain not relieved by pain medication, dizziness, fever >101.5, or increased redness or drainage from incisions.  
77 y/o female, never smoker, with a PMH of DM, HTN, HLD, osteoporosis, dementia, schizophrenia, with recent CT chest done on 1/9/22 revealing RLL spiculated mass. FNA positive for adenocarcinoma.  On 3/1/22 Patient underwent RVATs, RA right lower lobectomy with MLND.  Post op brought to CTICU as patient was lethargic at end of procedure.  Arrive intubated, extubated later on POD 0.  POD 1 [place] reinserted for urinary retention, patient self removed.  CT to suction.    ==== Neurovascular ====  -No delirium, pain well managed on current regimen  -C/w PRNs for Pain control  -Monitor neuro status  -Continue psych meds    ==== Respiratory ====  -Saturates well on RA     -AM CXR stable, repeat in AM  -Encourage IS 10x/hour while awake, Cough and deep breathing exercises  -Monitor respiratory status via SpO2    ==== Cardiovascular ====  Monitor on Tele  Continue Statin    ==== GI ====   -Tolerating PO  -Prophylaxis: Protonix  -C/w bowel regimen    ==== /Renal ====  -BUN/Cr: 23/0.88  -Trend Cr on AM labs  -Replete electrolytes as needed    ==== ID ====   Afebrile, asymptomatic  -WBC: 14.22  -Continue to monitor for SIRS/Sepsis syndrome while inpatient    ==== Endocrine ====   -A1C: 6.8, no h/o DM  -TSH: no h/o thyroid disease     ==== Hematologic ====   -H/H: 14.22/41.1  -CBC, chem in AM  -DVT ppx: HSQ 5000u q8h and SCDs    ==== Disposition Planning ====   Telemetry
A/P: 76 year old female, with PMHx of DM, HTN, HLD, Osteoporosis, Dementia, Schizophrenia, with RLL spiculated nodule found on CT chest s/p EBUS with bx + Adenocarcinoma. Patient was evaluated by Dr. Bermudez and presented to [facility] on 3/1/2022 for planned surgery. Patient underwent R VATS RA Right Lower Lobectomy with MLND. Procedure was uncomplicated, at end of procedure patient was noted to be lethargic and not ready for extubation. She was transferred to CT ICU intubated and stable. She was later extubated on POD#0. On POD#1 patient required robles replaced for urinary retention. She otherwise remained stable and transferred to  on POD#1. Robles was incidentally removed on POD#1 by patient and she later passed TOV. No acute events overnight.     Neurovascular: Stable. Pain well controlled with current regimen.  + Dementia/Schizophrenia, continue donepezil 5mg, seroquel 100mg, keppra 500mg BID   - Continue tylenol PRN     Cardiovascular: Hemodynamically stable. HR controlled.  - HLD, continue atorvastatin 10mg qhs   - HTN, restarted home losartan 50mg today. Consider restarting home HCTZ this afternoon if BP can tolerate.     Respiratory: 02 Sat = 92% on 2L NC   - Continue to wean supplemental O2 to maintain SaO2>  93%   - Chest tubes x 2 on suction with 1/5 air leak. Continue to maintain on suction today   - Daily CXR, today stable no ptx or subq air   - Encourage C+DB and Use of IS 10x / hr while awake.    GI: Stable.  - Continue PO diet   - Continue pepcid 20mg for GI ppx   - Cotninue bowel regimen     Renal / : BUN/Cr 21/0.84. No active issues   - Monitor renal function.  - Monitor I/O's.    Endocrine:  Hgba1c 6.8, TSH pending   - No active issues     Hematologic:  - Continue oral chemotherapy     Prophylaxis:  - DVT prophylaxis with 5000 SubQ Heparin q8h.  - SCD's    Disposition:  - Pending chest tube management 
76 year old female, with PMHx of DM, HTN, HLD, Osteoporosis, Dementia, Schizophrenia, with RLL spiculated nodule found on CT chest s/p EBUS with bx + Adenocarcinoma. Patient was evaluated by Dr. Bermudez and presented to [facility] on 3/1/2022 for planned surgery. Patient underwent R VATS RA Right Lower Lobectomy with MLND. Procedure was uncomplicated, at end of procedure patient was noted to be lethargic and not ready for extubation. She was transferred to CT ICU intubated and stable. She was later extubated on POD#0. On POD#1 patient required robles replacement for urinary retention. She otherwise remained stable and transferred to  on POD#1. Robles was incidentally removed on POD#1 by patient and she later passed TOV. No acute events overnight.  POD 2 doing well overall.  Some tidaling in tubes, small bubbling in waterseal chamber.      Neurovascular: Stable. Pain well controlled with current regimen.  + Dementia/Schizophrenia, continue donepezil 5mg, seroquel 100mg, keppra 500mg BID   - Continue tylenol PRN     Cardiovascular: Hemodynamically stable. HR controlled.  - HLD, continue atorvastatin 10mg qhs   - HTN, restarted home losartan 50mg today. Consider restarting home HCTZ this afternoon if BP can tolerate.     Respiratory: 02 Sat = 92% on 2L NC   - Continue to wean supplemental O2 to maintain SaO2>  93%   - Chest tubes x 2 on suction with 1/5 air leak. Both tubes are on waterseal for now.  F/U am CXR tomorrow.   - Daily CXR, today stable no ptx or subq air   - Encourage C+DB and Use of IS 10x / hr while awake.    GI: Stable.  - Continue PO diet   - Continue pepcid 20mg for GI ppx   - Cotninue bowel regimen     Renal / : BUN/Cr 21/0.84. No active issues   - Monitor renal function.  - Monitor I/O's.    Endocrine:  Hgba1c 6.8, TSH pending   - No active issues     Hematologic:  - Continue oral chemotherapy     Prophylaxis:  - DVT prophylaxis with 5000 SubQ Heparin q8h.  - SCD's    Disposition:  - Pending chest tube management       
A/P:    76 year old female, with PMHx of DM, HTN, HLD, Osteoporosis, Dementia, Schizophrenia, with RLL spiculated nodule found on CT chest s/p EBUS with bx + Adenocarcinoma. She was referred to Dr. Bermudez for surgical evaluation and deemed a good surgical candidate. On 3/1 she underwent R VATS RA Right Lower Lobectomy with MLND. Procedure was uncomplicated, at end of procedure patient was noted to be lethargic and not ready for extubation. She was transferred to CTICU intubated and stable. She was later extubated on POD#0. On POD#1 patient required robles replacement for urinary retention. She otherwise remained stable and transferred to  on POD#1. Robles was incidentally removed on POD#1 by patient and she later passed TOV. POD#2 remained stable with chest tubes on water seal. POD#4 one chest tube removed after successful clamp trial. POD#5 remaining CT removed after clamp trial. Worked with PT and unable to ambulate more than a few steps.     Neurovascular:   - Pain well controlled with current regimen.  -Continue tylenol, tramadol PRN  - Continue lidocaine patch  - Hx Dementia/ Schizophrenia: continue donepezil 5 mg QHS, seroquel 100 mg QHS, Keppra 500  mg daily     Cardiovascular:   -Hemodynamically stable. HR controlled (78-98)  - Hx of HTN, BP controlled (118//86), continue losartan 50 mg daily  - Hx HLD: continue atorvastatin 10 mg daily     Respiratory:   - POD5 s/p R VATS, RA Right lower lobectomy, MLND (+adenocarcinoma)  - Both CTs now out, CXR stable   - 02 Sat = 96% on RA.  -Encourage C+DB and Use of IS 10x / hr while awake.  -CXR stable, no obvious PTX     GI:   - Stable.  -Continue GI PPX with protonix  -PO Diet.    Renal / :  - BUN/Cr stable at 28/0.74  -Continue to monitor renal function.  -Monitor I/O's.  - Replete electrolytes PRN  - Continue diuresis with hydrochlorothiazide 12.5 mg daily     Endocrine:    -A1c 6.8, known hx DM, continue MISS  -TSH 2.260, no known hx thyroid disease     Hematologic:  -H/H stable at 12.8/39.5 with no obvious signs of bleeding  -Coagulation Panel.  - Continue home oral chemo med: raloxifene 60 mg daily     ID:  -Pt remains afebrile with no elevation in WBC or signs of infection  -Continue to monitor CBC  -Observe for SIRS/Sepsis Syndrome.    Prophylaxis:  -DVT prophylaxis with 5000 SubQ Heparin q8h.  -SCD's    Disposition:  -Home vs. [facility] when medically appropriate pending further work with PT

## 2022-03-09 LAB
CULTURE RESULTS: SIGNIFICANT CHANGE UP
SPECIMEN SOURCE: SIGNIFICANT CHANGE UP
SURGICAL PATHOLOGY STUDY: SIGNIFICANT CHANGE UP

## 2022-03-15 ENCOUNTER — OUTPATIENT (OUTPATIENT)
Dept: OUTPATIENT SERVICES | Facility: HOSPITAL | Age: 77
LOS: 1 days | End: 2022-03-15
Payer: MEDICARE

## 2022-03-15 ENCOUNTER — APPOINTMENT (OUTPATIENT)
Dept: THORACIC SURGERY | Facility: CLINIC | Age: 77
End: 2022-03-15
Payer: MEDICARE

## 2022-03-15 VITALS
OXYGEN SATURATION: 92 % | TEMPERATURE: 96.4 F | HEIGHT: 63 IN | DIASTOLIC BLOOD PRESSURE: 73 MMHG | RESPIRATION RATE: 17 BRPM | WEIGHT: 126 LBS | BODY MASS INDEX: 22.32 KG/M2 | SYSTOLIC BLOOD PRESSURE: 159 MMHG | HEART RATE: 85 BPM

## 2022-03-15 PROCEDURE — 99024 POSTOP FOLLOW-UP VISIT: CPT

## 2022-03-15 PROCEDURE — 71046 X-RAY EXAM CHEST 2 VIEWS: CPT

## 2022-03-15 PROCEDURE — 71046 X-RAY EXAM CHEST 2 VIEWS: CPT | Mod: 26

## 2022-04-20 LAB
CULTURE RESULTS: SIGNIFICANT CHANGE UP
SPECIMEN SOURCE: SIGNIFICANT CHANGE UP

## 2022-04-22 ENCOUNTER — OUTPATIENT (OUTPATIENT)
Dept: OUTPATIENT SERVICES | Facility: HOSPITAL | Age: 77
LOS: 1 days | End: 2022-04-22
Payer: MEDICARE

## 2022-04-22 ENCOUNTER — APPOINTMENT (OUTPATIENT)
Dept: THORACIC SURGERY | Facility: CLINIC | Age: 77
End: 2022-04-22
Payer: MEDICARE

## 2022-04-22 VITALS
WEIGHT: 120 LBS | TEMPERATURE: 98 F | BODY MASS INDEX: 21.26 KG/M2 | SYSTOLIC BLOOD PRESSURE: 140 MMHG | RESPIRATION RATE: 17 BRPM | HEART RATE: 82 BPM | DIASTOLIC BLOOD PRESSURE: 63 MMHG | OXYGEN SATURATION: 93 % | HEIGHT: 63 IN

## 2022-04-22 DIAGNOSIS — R93.89 ABNORMAL FINDINGS ON DIAGNOSTIC IMAGING OF OTHER SPECIFIED BODY STRUCTURES: ICD-10-CM

## 2022-04-22 DIAGNOSIS — Z01.818 ENCOUNTER FOR OTHER PREPROCEDURAL EXAMINATION: ICD-10-CM

## 2022-04-22 DIAGNOSIS — R91.8 OTHER NONSPECIFIC ABNORMAL FINDING OF LUNG FIELD: ICD-10-CM

## 2022-04-22 PROCEDURE — 71046 X-RAY EXAM CHEST 2 VIEWS: CPT | Mod: 26

## 2022-04-22 PROCEDURE — 71046 X-RAY EXAM CHEST 2 VIEWS: CPT

## 2022-04-22 PROCEDURE — 99024 POSTOP FOLLOW-UP VISIT: CPT

## 2022-04-22 RX ORDER — FOLIC ACID 1 MG/1
1 TABLET ORAL DAILY
Refills: 0 | Status: ACTIVE | COMMUNITY

## 2022-04-22 RX ORDER — METOCLOPRAMIDE 10 MG/1
10 TABLET ORAL EVERY 8 HOURS
Refills: 0 | Status: ACTIVE | COMMUNITY

## 2022-08-09 PROBLEM — Z85.118 HISTORY OF ADENOCARCINOMA OF LUNG: Status: RESOLVED | Noted: 2022-04-22 | Resolved: 2022-08-09

## 2022-08-09 PROBLEM — Z09 POSTOP CHECK: Status: RESOLVED | Noted: 2022-03-15 | Resolved: 2022-08-09

## 2022-08-10 ENCOUNTER — APPOINTMENT (OUTPATIENT)
Dept: THORACIC SURGERY | Facility: CLINIC | Age: 77
End: 2022-08-10

## 2022-08-10 PROCEDURE — 99442: CPT | Mod: 95

## 2022-08-10 RX ORDER — OSIMERTINIB 80 1/1
TABLET, FILM COATED ORAL
Refills: 0 | Status: ACTIVE | COMMUNITY

## 2022-08-10 RX ORDER — DEXAMETHASONE 4 MG/1
4 TABLET ORAL TWICE DAILY
Refills: 0 | Status: DISCONTINUED | COMMUNITY
End: 2022-08-10

## 2022-08-10 RX ORDER — CYANOCOBALAMIN 1000 UG/ML
1000 INJECTION INTRAMUSCULAR; SUBCUTANEOUS
Refills: 0 | Status: DISCONTINUED | COMMUNITY
End: 2022-08-10

## 2022-08-10 NOTE — ASSESSMENT
[FreeTextEntry1] : 77 y/o female, never smoker, with a PMH of DM, HTN, HLD, osteoporosis, dementia, schizophrenia, who was referred by Lissa Suarez for a RLL spiculated mass, found during the pre-op workup for cataract surgery. She underwent s/p RVATS, RA, Extensive lysis of pulmonary adhesions, RLL lobectomy, MLND on 3/1/22. Pathology: invasive papillary adenocarcinoma. pT2a, N0, + JACOBO, +VPI, +LVI, -margins, 0/7 lymph nodes. Brain MRI on 2/20/22 negative for metastasis. She completed adjuvant chemo carboplatin/pemetrexed in July 2022, was started Tagrisso daily by Dr. Gunnar Bonilla. Patient presented today with a surveillance CT chest. \par \par CT chest on 08/08/2022 reviewed and discussed with pt's son Evan. Stable post surgical changes and GGNs in b/l lung fields. Patient is in her usual status, no unintentional weight loss, fever, chills, cough, or SOB, hemoptysis. Will continue lung Ca. surveillance with a CT chest in 6 months. \par \par \par Plan: follow up in 6 months with a noncontrast CT chest

## 2022-08-10 NOTE — HISTORY OF PRESENT ILLNESS
[FreeTextEntry1] : 75 y/o female, never smoker, with a PMH of DM, HTN, HLD, osteoporosis, dementia, schizophrenia, who was referred by Lissa Suarez for a RLL spiculated mass, found during the pre-op workup for cataract surgery. She underwent s/p RVATS, RA, Extensive lysis of pulmonary adhesions, RLL lobectomy, MLND on 3/1/22. Pathology: invasive papillary adenocarcinoma. pT2a, N0, + JACOBO, +VPI, +LVI, -margins, 0/7 lymph nodes. Brain MRI on 2/20/22 negative for metastasis. She completed adjuvant chemo carboplatin/pemetrexed in July 2022, was started Tagrisso daily by Dr. Gunnar Bonilla. \par Patient presents today with a surveillance CT chest. Per pt's son Evan, she is in her usual status, no unintentional weight loss, fever, chills, cough, or SOB, hemoptysis. \par \par CT chest on 08/08/2022\par - s/p right lung surgery with post surgical changes including RLL Lobectomy\par - stable GGNs \par \par

## 2022-08-12 ENCOUNTER — APPOINTMENT (OUTPATIENT)
Dept: THORACIC SURGERY | Facility: CLINIC | Age: 77
End: 2022-08-12

## 2022-08-12 DIAGNOSIS — Z09 ENCOUNTER FOR FOLLOW-UP EXAMINATION AFTER COMPLETED TREATMENT FOR CONDITIONS OTHER THAN MALIGNANT NEOPLASM: ICD-10-CM

## 2022-08-12 DIAGNOSIS — Z85.118 PERSONAL HISTORY OF OTHER MALIGNANT NEOPLASM OF BRONCHUS AND LUNG: ICD-10-CM

## 2023-02-15 ENCOUNTER — APPOINTMENT (OUTPATIENT)
Dept: THORACIC SURGERY | Facility: CLINIC | Age: 78
End: 2023-02-15
Payer: MEDICARE

## 2023-02-15 DIAGNOSIS — Z08 ENCOUNTER FOR FOLLOW-UP EXAMINATION AFTER COMPLETED TREATMENT FOR MALIGNANT NEOPLASM: ICD-10-CM

## 2023-02-15 DIAGNOSIS — Z85.118 ENCOUNTER FOR FOLLOW-UP EXAMINATION AFTER COMPLETED TREATMENT FOR MALIGNANT NEOPLASM: ICD-10-CM

## 2023-02-15 DIAGNOSIS — R91.1 SOLITARY PULMONARY NODULE: ICD-10-CM

## 2023-02-15 PROCEDURE — 99442: CPT | Mod: 95

## 2023-02-15 NOTE — HISTORY OF PRESENT ILLNESS
[FreeTextEntry1] : 76 y/o female, never smoker, with a PMH of DM, HTN, HLD, osteoporosis, dementia, schizophrenia, who was referred by Lissa Suarez for a RLL spiculated mass, found during the pre-op workup for cataract surgery. \par \par On 03/01/2022, she underwent RVATs, RA, RLL lobectomy for a Stage IB, pT2a N0, invasive papillary adenocarcinoma, + JACOBO, +VPI, +LVI, -margins.  She completed adjuvant chemo carboplatin/pemetrexed in July 2022, was started Tagrisso daily by Dr. Gunnar Bonilla. \par \par Patient presents today with a surveillance CT chest. Per pt's son Evan, she is in her usual status, no unintentional weight loss, fever, chills, cough, or SOB, hemoptysis. Received COVID vaccine around 3 weeks ago. \par \par CT chest on 02/12/2023\par - Less conspicuous appearance of GGN in the LLL. Another previously seen LLL GGN measuring 8mm is not seen today. Vague ground-glass nodularity in the RUL in the location of previously described GGN on 01/22/2022. Constellation of findings suggest either response to therapy or resolving infectious/inflammatory etiology. \par - GGN measuring 6mm in the superior aspect of the RUL, in retrospect similar to prior studies dating back to 1/22/22. \par - RLL lobectomy, similar\par - Subcentimeter b/l axillary LNs which are increased in size from prior study measuring up to 0.7cm. \par \par \par

## 2023-02-15 NOTE — DATA REVIEWED
[FreeTextEntry1] : CT chest on 08/08/2022\par - s/p right lung surgery with post surgical changes including RLL Lobectomy\par - stable GGNs

## 2023-02-15 NOTE — ASSESSMENT
[FreeTextEntry1] : 76 y/o female, never smoker, with a PMH of DM, HTN, HLD, osteoporosis, dementia, schizophrenia, who was referred by Lissa Suarez for a RLL spiculated mass, found during the pre-op workup for cataract surgery. \par \par On 03/01/2022, she underwent RVATs, RA, RLL lobectomy for a Stage IB, pT2a N0, invasive papillary adenocarcinoma, + JACOBO, +VPI, +LVI, -margins.  She completed adjuvant chemo carboplatin/pemetrexed in July 2022, was started Tagrisso daily by Dr. Gunnar Bonilla. \par \par CT chest on 02/12/2023 was reviewed with pt's son today. The ground glass nodules in LLL and RUL are improving, suggesting either response to therapy or resolving infectious/inflammatory etiology. The GGN measuring 6mm in the superior aspect of the RUL, in retrospect similar to prior studies dating back to 1/22/22. There're subcentimeter b/l axillary LNs measuring up to 0.7cm, may relates to recent vaccination. Patient is stable. \par \par Will continue lung Ca. surveillance with a CT chest in 6 months. Pt's son verbally understood and agreed with the plan. \par \par Plan: follow up in 6 months with a CT chest\par \par

## 2023-02-17 ENCOUNTER — APPOINTMENT (OUTPATIENT)
Dept: THORACIC SURGERY | Facility: CLINIC | Age: 78
End: 2023-02-17
Payer: MEDICARE

## 2024-03-15 NOTE — DIETITIAN INITIAL EVALUATION ADULT. - CALCULATED TO (G/KG)
Problem: Wound:  Goal: Will show signs of wound healing; wound closure and no evidence of infection  Description: Will show signs of wound healing; wound closure and no evidence of infection  Outcome: Progressing   Patient presents to wound clinic for evaluation and treatment of right lower leg wounds. Discharge instructions/dressing orders per AVS. Called [facility] to see if they are able to do patients dressings at facility and they said they are. Follow up appt scheduled in 3 weeks.    Care plan reviewed with patient and son.  Patient and son verbalize understanding of the plan of care and contribute to goal setting.     
79.94
